# Patient Record
Sex: MALE | Race: WHITE | Employment: OTHER | URBAN - METROPOLITAN AREA
[De-identification: names, ages, dates, MRNs, and addresses within clinical notes are randomized per-mention and may not be internally consistent; named-entity substitution may affect disease eponyms.]

---

## 2017-04-07 ENCOUNTER — CARE COORDINATION (OUTPATIENT)
Dept: CASE MANAGEMENT | Facility: CLINIC | Age: 82
End: 2017-04-07

## 2019-11-18 ENCOUNTER — ANESTHESIA EVENT (OUTPATIENT)
Dept: GASTROENTEROLOGY | Facility: CLINIC | Age: 84
End: 2019-11-18
Payer: COMMERCIAL

## 2019-11-18 ENCOUNTER — ANESTHESIA (OUTPATIENT)
Dept: GASTROENTEROLOGY | Facility: CLINIC | Age: 84
End: 2019-11-18
Payer: COMMERCIAL

## 2019-11-18 ENCOUNTER — HOSPITAL ENCOUNTER (OUTPATIENT)
Facility: CLINIC | Age: 84
Discharge: HOME | End: 2019-11-18
Admitting: PATHOLOGY
Payer: COMMERCIAL

## 2019-11-18 VITALS
HEART RATE: 88 BPM | OXYGEN SATURATION: 98 % | HEIGHT: 71 IN | SYSTOLIC BLOOD PRESSURE: 159 MMHG | BODY MASS INDEX: 19.18 KG/M2 | WEIGHT: 137 LBS | DIASTOLIC BLOOD PRESSURE: 71 MMHG | RESPIRATION RATE: 16 BRPM

## 2019-11-18 DIAGNOSIS — D47.2 MONOCLONAL GAMMOPATHY: Primary | ICD-10-CM

## 2019-11-18 LAB
BASOPHILS # BLD AUTO: 0 10E9/L (ref 0–0.2)
BASOPHILS NFR BLD AUTO: 0.6 %
DIFFERENTIAL METHOD BLD: ABNORMAL
EOSINOPHIL # BLD AUTO: 0.1 10E9/L (ref 0–0.7)
EOSINOPHIL NFR BLD AUTO: 1.7 %
ERYTHROCYTE [DISTWIDTH] IN BLOOD BY AUTOMATED COUNT: 13.3 % (ref 10–15)
HCT VFR BLD AUTO: 36.9 % (ref 40–53)
HGB BLD-MCNC: 12.2 G/DL (ref 13.3–17.7)
IMM GRANULOCYTES # BLD: 0 10E9/L (ref 0–0.4)
IMM GRANULOCYTES NFR BLD: 0.3 %
LYMPHOCYTES # BLD AUTO: 1.1 10E9/L (ref 0.8–5.3)
LYMPHOCYTES NFR BLD AUTO: 16.3 %
MCH RBC QN AUTO: 33.5 PG (ref 26.5–33)
MCHC RBC AUTO-ENTMCNC: 33.1 G/DL (ref 31.5–36.5)
MCV RBC AUTO: 101 FL (ref 78–100)
MONOCYTES # BLD AUTO: 0.7 10E9/L (ref 0–1.3)
MONOCYTES NFR BLD AUTO: 10.3 %
NEUTROPHILS # BLD AUTO: 4.9 10E9/L (ref 1.6–8.3)
NEUTROPHILS NFR BLD AUTO: 70.8 %
NRBC # BLD AUTO: 0 10*3/UL
NRBC BLD AUTO-RTO: 0 /100
PLATELET # BLD AUTO: 239 10E9/L (ref 150–450)
RBC # BLD AUTO: 3.64 10E12/L (ref 4.4–5.9)
RETICS # AUTO: 68.8 10E9/L (ref 25–95)
RETICS/RBC NFR AUTO: 1.9 % (ref 0.5–2)
WBC # BLD AUTO: 6.9 10E9/L (ref 4–11)

## 2019-11-18 PROCEDURE — 85097 BONE MARROW INTERPRETATION: CPT | Performed by: INTERNAL MEDICINE

## 2019-11-18 PROCEDURE — 38221 DX BONE MARROW BIOPSIES: CPT | Performed by: INTERNAL MEDICINE

## 2019-11-18 PROCEDURE — 25000128 H RX IP 250 OP 636: Performed by: NURSE ANESTHETIST, CERTIFIED REGISTERED

## 2019-11-18 PROCEDURE — 88237 TISSUE CULTURE BONE MARROW: CPT | Performed by: INTERNAL MEDICINE

## 2019-11-18 PROCEDURE — 88184 FLOWCYTOMETRY/ TC 1 MARKER: CPT | Performed by: PATHOLOGY

## 2019-11-18 PROCEDURE — 88313 SPECIAL STAINS GROUP 2: CPT | Performed by: INTERNAL MEDICINE

## 2019-11-18 PROCEDURE — 25800030 ZZH RX IP 258 OP 636: Performed by: NURSE ANESTHETIST, CERTIFIED REGISTERED

## 2019-11-18 PROCEDURE — 88342 IMHCHEM/IMCYTCHM 1ST ANTB: CPT | Performed by: INTERNAL MEDICINE

## 2019-11-18 PROCEDURE — 85045 AUTOMATED RETICULOCYTE COUNT: CPT | Performed by: PATHOLOGY

## 2019-11-18 PROCEDURE — 88311 DECALCIFY TISSUE: CPT | Mod: 26 | Performed by: INTERNAL MEDICINE

## 2019-11-18 PROCEDURE — 40000795 ZZHCL STATISTIC DNA PROCESS AND HOLD: Performed by: PATHOLOGY

## 2019-11-18 PROCEDURE — 36415 COLL VENOUS BLD VENIPUNCTURE: CPT | Performed by: PATHOLOGY

## 2019-11-18 PROCEDURE — 88305 TISSUE EXAM BY PATHOLOGIST: CPT | Mod: 26 | Performed by: INTERNAL MEDICINE

## 2019-11-18 PROCEDURE — 00000008 ZZHCL STATISTIC ADDL BM ASP PERF PF 38220: Performed by: INTERNAL MEDICINE

## 2019-11-18 PROCEDURE — 40000010 ZZH STATISTIC ANES STAT CODE-CRNA PER MINUTE: Performed by: PATHOLOGY

## 2019-11-18 PROCEDURE — 85025 COMPLETE CBC W/AUTO DIFF WBC: CPT | Performed by: PATHOLOGY

## 2019-11-18 PROCEDURE — 88305 TISSUE EXAM BY PATHOLOGIST: CPT | Performed by: INTERNAL MEDICINE

## 2019-11-18 PROCEDURE — 88313 SPECIAL STAINS GROUP 2: CPT | Mod: 26 | Performed by: INTERNAL MEDICINE

## 2019-11-18 PROCEDURE — 38221 DX BONE MARROW BIOPSIES: CPT | Performed by: PATHOLOGY

## 2019-11-18 PROCEDURE — 38222 DX BONE MARROW BX & ASPIR: CPT | Performed by: PATHOLOGY

## 2019-11-18 PROCEDURE — 37000008 ZZH ANESTHESIA TECHNICAL FEE, 1ST 30 MIN: Performed by: PATHOLOGY

## 2019-11-18 PROCEDURE — 40001004 ZZHCL STATISTIC FLOW INT 9-15 ABY TC 88188: Performed by: PATHOLOGY

## 2019-11-18 PROCEDURE — 00000159 ZZHCL STATISTIC H-SEND OUTS PREP: Performed by: INTERNAL MEDICINE

## 2019-11-18 PROCEDURE — 88271 CYTOGENETICS DNA PROBE: CPT | Performed by: INTERNAL MEDICINE

## 2019-11-18 PROCEDURE — 88280 CHROMOSOME KARYOTYPE STUDY: CPT | Performed by: INTERNAL MEDICINE

## 2019-11-18 PROCEDURE — 40000424 ZZHCL STATISTIC BONE MARROW CORE PERF TC 38221: Performed by: INTERNAL MEDICINE

## 2019-11-18 PROCEDURE — 00000058 ZZHCL STATISTIC BONE MARROW ASP PERF TC 38220: Performed by: INTERNAL MEDICINE

## 2019-11-18 PROCEDURE — 88185 FLOWCYTOMETRY/TC ADD-ON: CPT | Performed by: PATHOLOGY

## 2019-11-18 PROCEDURE — 85060 BLOOD SMEAR INTERPRETATION: CPT | Performed by: INTERNAL MEDICINE

## 2019-11-18 PROCEDURE — 88342 IMHCHEM/IMCYTCHM 1ST ANTB: CPT | Mod: 26 | Performed by: INTERNAL MEDICINE

## 2019-11-18 PROCEDURE — 40000847 ZZHCL STATISTIC MORPHOLOGY W/INTERP HISTOLOGY TC 85060: Performed by: INTERNAL MEDICINE

## 2019-11-18 PROCEDURE — 38222 DX BONE MARROW BX & ASPIR: CPT | Performed by: INTERNAL MEDICINE

## 2019-11-18 PROCEDURE — 88264 CHROMOSOME ANALYSIS 20-25: CPT | Performed by: INTERNAL MEDICINE

## 2019-11-18 PROCEDURE — 88275 CYTOGENETICS 100-300: CPT | Performed by: INTERNAL MEDICINE

## 2019-11-18 PROCEDURE — 88311 DECALCIFY TISSUE: CPT | Performed by: INTERNAL MEDICINE

## 2019-11-18 PROCEDURE — 40000948 ZZHCL STATISTIC BONE MARROW ASP TC 85097: Performed by: INTERNAL MEDICINE

## 2019-11-18 RX ORDER — LIDOCAINE HYDROCHLORIDE 10 MG/ML
8-10 INJECTION, SOLUTION EPIDURAL; INFILTRATION; INTRACAUDAL; PERINEURAL
Status: DISCONTINUED | OUTPATIENT
Start: 2019-11-18 | End: 2019-11-18 | Stop reason: HOSPADM

## 2019-11-18 RX ORDER — FLUMAZENIL 0.1 MG/ML
0.2 INJECTION, SOLUTION INTRAVENOUS
Status: DISCONTINUED | OUTPATIENT
Start: 2019-11-18 | End: 2019-11-18 | Stop reason: HOSPADM

## 2019-11-18 RX ORDER — HYDROMORPHONE HYDROCHLORIDE 1 MG/ML
.3-.5 INJECTION, SOLUTION INTRAMUSCULAR; INTRAVENOUS; SUBCUTANEOUS EVERY 5 MIN PRN
Status: DISCONTINUED | OUTPATIENT
Start: 2019-11-18 | End: 2019-11-18 | Stop reason: HOSPADM

## 2019-11-18 RX ORDER — SODIUM CHLORIDE, SODIUM LACTATE, POTASSIUM CHLORIDE, CALCIUM CHLORIDE 600; 310; 30; 20 MG/100ML; MG/100ML; MG/100ML; MG/100ML
INJECTION, SOLUTION INTRAVENOUS CONTINUOUS
Status: DISCONTINUED | OUTPATIENT
Start: 2019-11-18 | End: 2019-11-18 | Stop reason: HOSPADM

## 2019-11-18 RX ORDER — UBIDECARENONE 75 MG
100 CAPSULE ORAL DAILY
Status: ON HOLD | COMMUNITY
End: 2021-05-01

## 2019-11-18 RX ORDER — ONDANSETRON 2 MG/ML
4 INJECTION INTRAMUSCULAR; INTRAVENOUS EVERY 30 MIN PRN
Status: DISCONTINUED | OUTPATIENT
Start: 2019-11-18 | End: 2019-11-18 | Stop reason: HOSPADM

## 2019-11-18 RX ORDER — ONDANSETRON 2 MG/ML
INJECTION INTRAMUSCULAR; INTRAVENOUS PRN
Status: DISCONTINUED | OUTPATIENT
Start: 2019-11-18 | End: 2019-11-18

## 2019-11-18 RX ORDER — NALOXONE HYDROCHLORIDE 0.4 MG/ML
.1-.4 INJECTION, SOLUTION INTRAMUSCULAR; INTRAVENOUS; SUBCUTANEOUS
Status: DISCONTINUED | OUTPATIENT
Start: 2019-11-18 | End: 2019-11-18 | Stop reason: HOSPADM

## 2019-11-18 RX ORDER — PROPOFOL 10 MG/ML
INJECTION, EMULSION INTRAVENOUS CONTINUOUS PRN
Status: DISCONTINUED | OUTPATIENT
Start: 2019-11-18 | End: 2019-11-18

## 2019-11-18 RX ORDER — MEPERIDINE HYDROCHLORIDE 25 MG/ML
12.5 INJECTION INTRAMUSCULAR; INTRAVENOUS; SUBCUTANEOUS EVERY 5 MIN PRN
Status: DISCONTINUED | OUTPATIENT
Start: 2019-11-18 | End: 2019-11-18 | Stop reason: HOSPADM

## 2019-11-18 RX ORDER — ALBUTEROL SULFATE 0.83 MG/ML
2.5 SOLUTION RESPIRATORY (INHALATION) EVERY 4 HOURS PRN
Status: DISCONTINUED | OUTPATIENT
Start: 2019-11-18 | End: 2019-11-18 | Stop reason: HOSPADM

## 2019-11-18 RX ORDER — HYDRALAZINE HYDROCHLORIDE 20 MG/ML
2.5-5 INJECTION INTRAMUSCULAR; INTRAVENOUS EVERY 10 MIN PRN
Status: DISCONTINUED | OUTPATIENT
Start: 2019-11-18 | End: 2019-11-18 | Stop reason: HOSPADM

## 2019-11-18 RX ORDER — SODIUM CHLORIDE, SODIUM LACTATE, POTASSIUM CHLORIDE, CALCIUM CHLORIDE 600; 310; 30; 20 MG/100ML; MG/100ML; MG/100ML; MG/100ML
INJECTION, SOLUTION INTRAVENOUS CONTINUOUS PRN
Status: DISCONTINUED | OUTPATIENT
Start: 2019-11-18 | End: 2019-11-18

## 2019-11-18 RX ORDER — FENTANYL CITRATE 50 UG/ML
25-50 INJECTION, SOLUTION INTRAMUSCULAR; INTRAVENOUS
Status: DISCONTINUED | OUTPATIENT
Start: 2019-11-18 | End: 2019-11-18 | Stop reason: HOSPADM

## 2019-11-18 RX ORDER — PROPOFOL 10 MG/ML
INJECTION, EMULSION INTRAVENOUS PRN
Status: DISCONTINUED | OUTPATIENT
Start: 2019-11-18 | End: 2019-11-18

## 2019-11-18 RX ORDER — ONDANSETRON 4 MG/1
4 TABLET, ORALLY DISINTEGRATING ORAL EVERY 30 MIN PRN
Status: DISCONTINUED | OUTPATIENT
Start: 2019-11-18 | End: 2019-11-18 | Stop reason: HOSPADM

## 2019-11-18 RX ORDER — LABETALOL HYDROCHLORIDE 5 MG/ML
10 INJECTION, SOLUTION INTRAVENOUS
Status: DISCONTINUED | OUTPATIENT
Start: 2019-11-18 | End: 2019-11-18 | Stop reason: HOSPADM

## 2019-11-18 RX ADMIN — PROPOFOL 75 MCG/KG/MIN: 10 INJECTION, EMULSION INTRAVENOUS at 12:48

## 2019-11-18 RX ADMIN — SODIUM CHLORIDE, POTASSIUM CHLORIDE, SODIUM LACTATE AND CALCIUM CHLORIDE: 600; 310; 30; 20 INJECTION, SOLUTION INTRAVENOUS at 12:45

## 2019-11-18 RX ADMIN — ONDANSETRON 4 MG: 2 INJECTION INTRAMUSCULAR; INTRAVENOUS at 12:49

## 2019-11-18 RX ADMIN — PROPOFOL 10 MG: 10 INJECTION, EMULSION INTRAVENOUS at 13:00

## 2019-11-18 RX ADMIN — PROPOFOL 30 MG: 10 INJECTION, EMULSION INTRAVENOUS at 12:48

## 2019-11-18 ASSESSMENT — MIFFLIN-ST. JEOR: SCORE: 1313.56

## 2019-11-18 NOTE — ANESTHESIA PREPROCEDURE EVALUATION
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]    Procedure(s):  BIOPSY, BONE MARROW    Past Medical History:   Diagnosis Date     Hypertension      Past Surgical History:   Procedure Laterality Date     APPENDECTOMY       CHOLECYSTECTOMY       HERNIA REPAIR       ORTHOPEDIC SURGERY      L ankle fx     PHACOEMULSIFICATION CLEAR CORNEA WITH STANDARD INTRAOCULAR LENS IMPLANT Right 2016    Procedure: PHACOEMULSIFICATION CLEAR CORNEA WITH STANDARD INTRAOCULAR LENS IMPLANT;  Surgeon: Frandy Weber MD;  Location: [facility]     THORACIC SURGERY      r lung lobectomy     VITRECTOMY PARSPLANA WITH 23 GAUGE SYSTEM Right 2016    Procedure: VITRECTOMY PARSPLANA WITH 23 GAUGE SYSTEM;  Surgeon: Deniz Ross MD;  Location: [facility]       Anesthesia Evaluation     . Pt has had prior anesthetic.            ROS/MED HX    ENT/Pulmonary:      (-) sleep apnea   Neurologic:       Cardiovascular:     (+) hypertension----. : . . . :. .       METS/Exercise Tolerance:     Hematologic:         Musculoskeletal:         GI/Hepatic:        (-) GERD   Renal/Genitourinary:         Endo:         Psychiatric:         Infectious Disease:         Malignancy:   (+)   Suspicion for multiple myeloma        Other:                          Physical Exam  Normal systems: cardiovascular, pulmonary and dental    Airway   Mallampati: II  TM distance: >3 FB  Neck ROM: full    Dental     Cardiovascular   Rhythm and rate: regular and normal      Pulmonary             Lab Results   Component Value Date    WBC 6.9 2019    HGB 12.2 (L) 2019    HCT 36.9 (L) 2019     2019     (L) 2005    POTASSIUM 4.5 2005    CHLORIDE 102 2005    CO2 27 2005    BUN 15 2005    CR 1.00 2005     (H) 2005    SUDHEER 7.5 (L) 2005    ALBUMIN 3.1 (L) 2005    PROTTOTAL  "6.7 01/26/2005     (H) 01/26/2005     (H) 01/26/2005    ALKPHOS 343 (H) 01/26/2005    BILITOTAL 1.3 01/26/2005    LIPASE 62 01/20/2005    AMYLASE 571 (H) 01/22/2005    PTT 27 01/20/2005    INR 1.07 01/21/2005       Preop Vitals  BP Readings from Last 3 Encounters:   11/18/19 (!) 185/93   02/17/16 180/90    Pulse Readings from Last 3 Encounters:   No data found for Pulse      Resp Readings from Last 3 Encounters:   11/18/19 16   02/17/16 16    SpO2 Readings from Last 3 Encounters:   11/18/19 99%   02/17/16 94%      Temp Readings from Last 1 Encounters:   02/17/16 35.9  C (96.7  F) (Temporal)    Ht Readings from Last 1 Encounters:   11/18/19 1.803 m (5' 11\")      Wt Readings from Last 1 Encounters:   11/18/19 62.1 kg (137 lb)    Estimated body mass index is 19.11 kg/m  as calculated from the following:    Height as of this encounter: 1.803 m (5' 11\").    Weight as of this encounter: 62.1 kg (137 lb).       Anesthesia Plan      History & Physical Review  History and physical reviewed and following examination; no interval change.    ASA Status:  3 .    NPO Status:  > 8 hours    Plan for MAC Reason for MAC:  Deep or markedly invasive procedure (G8)  PONV prophylaxis:  Ondansetron (or other 5HT-3)       Postoperative Care  Postoperative pain management:  IV analgesics and Oral pain medications.      Consents  Anesthetic plan, risks, benefits and alternatives discussed with:  Patient..                 Iliana Hamilton MD, MD  "

## 2019-11-18 NOTE — ANESTHESIA CARE TRANSFER NOTE
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    Anesthesia Type:   MAC     Note:  Airway :Face Mask  Patient transferred to:PACU  Comments: After procedure in [facility] [place] under monitored anesthesia care (MAC), patient exhibited spontaneous respirations, oxygen via facemask with oxygen saturation maintained greater than 98%, patient brought to [facility] recovery bay for postprocedure recovery, SpO2, NiBP, and EKG monitors and alarms on and functioning, report on patient's clinical status given to PACU RN, RN questions answered, oxygen tubing connected to wall O2 in recovery room.Handoff Report: Identifed the Patient, Identified the Reponsible Provider, Reviewed the pertinent medical history, Discussed the surgical course, Reviewed Intra-OP anesthesia mangement and issues during anesthesia, Set expectations for post-procedure period and Allowed opportunity for questions and acknowledgement of understanding      Vitals: (Last set prior to Anesthesia Care Transfer)    CRNA VITALS  11/18/2019 1237 - 11/18/2019 1314      11/18/2019             Ht Rate:  88    Resp Rate (set):  10                Electronically Signed By: SANAZ Mascorro CRNA  November 18, 2019  1:14 PM

## 2019-11-18 NOTE — ANESTHESIA POSTPROCEDURE EVALUATION
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    Anesthesia Type:  MAC    Note:  Anesthesia Post Evaluation    Patient location during evaluation: PACU  Patient participation: Able to fully participate in evaluation  Level of consciousness: awake and alert  Pain management: adequate  Airway patency: patent  Cardiovascular status: acceptable  Respiratory status: acceptable  Hydration status: acceptable  PONV: none     Anesthetic complications: None          Last vitals:  Vitals:    11/18/19 1330 11/18/19 1340 11/18/19 1341   BP: (!) 169/76 (!) 159/71    Pulse: 86 88    Resp: 15 12 16   SpO2: 96% 99% 98%         Electronically Signed By: Iliana Hamilton MD, MD  November 18, 2019  4:29 PM

## 2019-11-18 NOTE — OR NURSING
Bone marrow dressing clean, dry and intact at discharge.  Patient understands discharge instructions.

## 2019-11-19 LAB — COPATH REPORT: NORMAL

## 2019-11-20 LAB
COPATH REPORT: NORMAL
COPATH REPORT: NORMAL

## 2019-11-26 LAB
COPATH REPORT: NORMAL
COPATH REPORT: NORMAL

## 2019-12-02 LAB — COPATH REPORT: NORMAL

## 2020-11-30 ENCOUNTER — HOSPITAL ENCOUNTER (EMERGENCY)
Facility: CLINIC | Age: 85
Discharge: HOME | End: 2020-11-30
Attending: EMERGENCY MEDICINE | Admitting: EMERGENCY MEDICINE
Payer: COMMERCIAL

## 2020-11-30 ENCOUNTER — APPOINTMENT (OUTPATIENT)
Dept: GENERAL RADIOLOGY | Facility: CLINIC | Age: 85
End: 2020-11-30
Attending: EMERGENCY MEDICINE
Payer: COMMERCIAL

## 2020-11-30 VITALS
OXYGEN SATURATION: 98 % | DIASTOLIC BLOOD PRESSURE: 92 MMHG | HEART RATE: 84 BPM | RESPIRATION RATE: 16 BRPM | SYSTOLIC BLOOD PRESSURE: 175 MMHG | TEMPERATURE: 98.1 F

## 2020-11-30 DIAGNOSIS — S32.010A COMPRESSION FRACTURE OF L1 VERTEBRA, INITIAL ENCOUNTER (H): ICD-10-CM

## 2020-11-30 DIAGNOSIS — S30.0XXA CONTUSION OF LOWER BACK, INITIAL ENCOUNTER: ICD-10-CM

## 2020-11-30 PROCEDURE — 72100 X-RAY EXAM L-S SPINE 2/3 VWS: CPT

## 2020-11-30 PROCEDURE — 99283 EMERGENCY DEPT VISIT LOW MDM: CPT

## 2020-11-30 ASSESSMENT — ENCOUNTER SYMPTOMS
BACK PAIN: 1
NUMBNESS: 1
FEVER: 0
HEMATURIA: 0

## 2020-11-30 NOTE — ED NOTES
Bed: ED19  Expected date: 11/30/20  Expected time: 12:51 PM  Means of arrival:   Comments:  triage

## 2020-11-30 NOTE — ED PROVIDER NOTES
"   History   Chief Complaint  Back Pain    The history is provided by the patient.      Praveen Velasquez is an 89 year old male with a history of hypertension and monoclonal gammopathy who presents for evaluation of lower back pain after falling off a step and landing on his back about one week ago. The pain has not worsened or improved since the onset. The patient is exacerbated by movement and improved by lying down. He does have pain in his left leg and tingling in the associated foot due to an old injury. He also has urinary incontinence, but this started prior to his back pain. When asked what he takes for the pain, he states \"anything he can find.\" He denies fevers and hematuria. He is not on blood thinners. He lives alone, but his brother comes to help care for him.     Allergies  No Known Drug Allergies    Medications  Lisinopril     Past Medical History  Hypertension  Monoclonal gammopathy     Past Surgical History  Appendectomy  Bone marrow biopsy  Cholecystectomy  Hernia repair  Left ankle fracture repair  Phacoemulsification clear cornea   Right lung lobectomy  Vitrectomy parsplana     Family History  History reviewed. No pertinent family history.    Social History  The patient was unaccompanied to the ED.  Smoking Status: current some day smoker  Smokeless Tobacco: never  Alcohol Use: yes  Drug Use: no    Review of Systems   Constitutional: Negative for fever.   Genitourinary: Negative for hematuria.   Musculoskeletal: Positive for back pain.   Neurological: Positive for numbness (chronic).        Chronic urinary incontinence   All other systems reviewed and are negative.      Physical Exam     Patient Vitals for the past 24 hrs:   BP Temp Temp src Pulse Resp SpO2   11/30/20 1445 (!) 175/92 98.1  F (36.7  C) Oral 84 -- 98 %   11/30/20 1312 -- -- -- -- -- 98 %   11/30/20 1310 125/72 -- -- 82 16 --       Physical Exam  Nursing note and vitals reviewed.  Constitutional:  Oriented to person, place, and time. " Cooperative.   HENT:   Nose:    Nose normal.   Mouth/Throat:   Mucous membranes are normal.   Eyes:    Conjunctivae normal and EOM are normal.      Pupils are equal, round, and reactive to light.   Neck:    Trachea normal.   Cardiovascular:  Normal rate, regular rhythm, normal heart sounds and normal pulses. No murmur heard.  Pulmonary/Chest:  Effort normal and breath sounds normal.   Abdominal:   Soft. Normal appearance and bowel sounds are normal.      There is no tenderness.      There is no rebound and no CVA tenderness.   Musculoskeletal:  Back is atraumatic. No reproducible tenderness to palpation at this time. Extremities atraumatic x 4.   Lymphadenopathy:  No cervical adenopathy.   Neurological:   Alert and oriented to person, place, and time. Normal strength.      No cranial nerve deficit or sensory deficit. GCS eye subscore is 4. GCS verbal subscore is 5. GCS motor subscore is 6. 5/5 strength in all muscle groups of the lower extremities.  Sensation intact to light touch distally. DTRs equal and symmetric in the lower extremities. Straight leg raises negative bilaterally.  Skin:    Skin is intact. No rash noted.   Psychiatric:   Normal mood and affect.      Emergency Department Course   Imaging:  Radiology findings were communicated with the patient who voiced understanding of the findings.    XR lumbar spine 2/3 views:  IMPRESSION: Five lumbar type vertebrae. Chronic-appearing moderate  anterior wedge compression deformity of T12. Age indeterminate mild  compression deformity of the L1 vertebral body. Vertebral body heights  and contours of the lumbar spine are otherwise within normal limits.  No definite recent fracture is noted. Alignment of the lumbar  vertebrae is normal.  Readings per Radiology    Emergency Department Course:  Past medical records, nursing notes, and vitals reviewed.    1305 I physically examined the patient as documented above.    The patient was sent for radiographs while in the  emergency department, results above.     1425 I consulted with Dr. Frandy Vail of radiology.     1430 I rechecked the patient and discussed the findings of their workup thus far.     Findings and plan explained to the Patient. Patient discharged home with instructions regarding supportive care, medications, and reasons to return. The importance of close follow-up was reviewed.     I personally reviewed the imaging results with the Patient and answered all related questions prior to discharge.     Impression & Plan   Medical Decision Making:  This is an 89-year-old male came in for further evaluation of low back pain after falling about a week ago.  He had x-rays obtained, showing the above findings, however it is unclear if any of these are actually new findings.  It is reassuring that he does not really have much pain when he is lying down, making it more likely that his pain is secondary to a contusion or a muscle strain.  He does not have any worrisome symptoms or exam findings for cauda equina syndrome, and therefore I do not feel that he warrants advanced imaging with CT or MRI.  I indicated that he should use ice or heat as well as Tylenol and ibuprofen.  He should follow-up with his physician as needed and return with any concerns or worsening symptoms.    Diagnosis:    ICD-10-CM    1. Contusion of lower back, initial encounter  S30.0XXA    2. Compression fracture of L1 vertebra, initial encounter (H)  S32.010A      Disposition:  Discharged to home.    Discharge Medications:  New Prescriptions    No medications on file     Scribe Disclosure:  I, Darline Toledo, am serving as a scribe at 12:58 PM on 11/30/2020 to document services personally performed by Chan Buck MD based on my observations and the provider's statements to me.      Chan Buck MD  11/30/20 1517       Chan Buck MD  11/30/20 8058

## 2020-11-30 NOTE — ED AVS SNAPSHOT
[facility] Emergency Dept  [address] [address]  Phone: [phone]  Fax: [phone]                                    Praveen Velasquez   MRN: [id number]    Department: [facility] Emergency Dept   Date of Visit: 11/30/2020           After Visit Summary Signature Page    I have received my discharge instructions, and my questions have been answered. I have discussed any challenges I see with this plan with the nurse or doctor.    ..........................................................................................................................................  Patient/Patient Representative Signature      ..........................................................................................................................................  Patient Representative Print Name and Relationship to Patient    ..................................................               ................................................  Date                                   Time    ..........................................................................................................................................  Reviewed by Signature/Title    ...................................................              ..............................................  Date                                               Time          22EPIC Rev 08/18

## 2021-01-01 ENCOUNTER — PATIENT OUTREACH (OUTPATIENT)
Dept: CARE COORDINATION | Facility: CLINIC | Age: 86
End: 2021-01-01
Payer: COMMERCIAL

## 2021-01-01 DIAGNOSIS — Z65.9 PSYCHOSOCIAL PROBLEM: Primary | ICD-10-CM

## 2021-01-01 ASSESSMENT — ACTIVITIES OF DAILY LIVING (ADL)
DEPENDENT_IADLS:: CLEANING;COOKING;LAUNDRY;SHOPPING;MEAL PREPARATION;MEDICATION MANAGEMENT;MONEY MANAGEMENT;TRANSPORTATION;INCONTINENCE

## 2021-01-11 ENCOUNTER — PATIENT OUTREACH (OUTPATIENT)
Dept: CARE COORDINATION | Facility: CLINIC | Age: 86
End: 2021-01-11

## 2021-01-11 DIAGNOSIS — F10.20 ALCOHOLISM (H): Primary | ICD-10-CM

## 2021-01-11 NOTE — PROGRESS NOTES
Clinic Care Coordination Contact  [facility]/Voicemail       Clinical Data: Care Coordinator Outreach  Outreach attempted x 1.  Left message on patient's voicemail with call back information and requested return call.  . Care Coordinator will try to reach patient again in 1-2 business days.

## 2021-01-12 ENCOUNTER — PATIENT OUTREACH (OUTPATIENT)
Dept: CARE COORDINATION | Facility: CLINIC | Age: 86
End: 2021-01-12

## 2021-01-12 ASSESSMENT — ACTIVITIES OF DAILY LIVING (ADL): DEPENDENT_IADLS:: CLEANING;LAUNDRY;SHOPPING;MEDICATION MANAGEMENT;MONEY MANAGEMENT;TRANSPORTATION

## 2021-01-12 NOTE — PROGRESS NOTES
Clinic Care Coordination Contact    Clinic Care Coordination Contact  OUTREACH    Referral Information:  Referral Source: PCP         Chief Complaint   Patient presents with     Clinic Care Coordination - Initial        Universal Utilization: na  Clinic Utilization  Difficulty keeping appointments:: No  Compliance Concerns: No  No-Show Concerns: No  Utilization    Last refreshed: 1/11/2021  9:52 AM: Hospital Admissions 0           Last refreshed: 1/11/2021  9:52 AM: ED Visits 1           Last refreshed: 1/11/2021  9:52 AM: No Show Count (past year) 0              Current as of: 1/11/2021  9:52 AM              Clinical Concerns:  Current Medical Concerns:   Stage three kidney disease, alcoholism  Current Behavioral Concerns: depression  Education Provided to patient: NA     Health Maintenance Reviewed:    Clinical Pathway: None    Medication Management:  Ipatropium Bromide, Lisinopril 10mg      Functional Status:  Dependent ADLs:: Ambulation-cane  Dependent IADLs:: Cleaning, Laundry, Shopping, Medication Management, Money Management, Transportation  Bed or wheelchair confined:: No  Mobility Status: Independent  Fallen 2 or more times in the past year?: Yes  Any fall with injury in the past year?: Yes    Living Situation:  Current living arrangement:: I live in a private home with family  Type of residence:: Private home - [facility]    Lifestyle & Psychosocial Needs:        Diet:: Regular  Inadequate nutrition (GOAL):: No  Tube Feeding: No  Transportation means:: Family     Restorationist or spiritual beliefs that impact treatment:: No  Mental health DX:: No  Mental health management concern (GOAL):: No  Chemical Dependency Status: Current Concern  Informal Support system:: Family   Socioeconomic History     Marital status:      Spouse name: Not on file     Number of children: Not on file     Years of education: Not on file     Highest education level: Not on file     Tobacco Use     Smoking status: Current Some Day  Smoker     Types: Cigarettes     Smokeless tobacco: Never Used   Substance and Sexual Activity     Alcohol use: Yes     Comment: 1-2 per day     Drug use: No     PC to Natan's daugther, Deann and left a vm. [facility] also phoned Praveen Huerta and spoke with his brother Mark. Mark stated that he and Praveen daughter Deann and son, Robbie (also his POA) are concerned for Praveen's safety as his living arrangements are conducive to falling. He lives in a mid [place] [facility] level home and has to go uip and down stairs to get around. Mark, his brother has spent the last 3 years staying with him for 6-9 months at a time although he has a family and life in [place] and would like to get back there. Son and daughter come in from time to time to check on him and keep him company. Karthik reportedly has no friends or social connections.  His 2nd wife passed away two years ago.    Per Mark, Karthik has been an alcoholic for over 50 years. The family outfit melia with alcohol on a weekly basis  because they are fearful of him going into withdrawls if they keep it from him. Karthik has had his drving privlieges revoked so he can no longer drive.     Karthik is not interested in moving out of his home at this time. Per his brother Mark,family would like to get in home supports in place such as: skilled nursing to help with medication manageement and checking of vitals, possbile personal care assistant to help with householed chores and / or cooking/ and or bathing if needed. Per Mark, Karthik has the capacity to private pay for services if need be.       [facility] phoned [facility] @[phone] and they would be able to do a home care evaluation and begin to implement services. After Medicare payments stop, they can help the family connect to private pay supports as well. [facility] will request order to be faxed t : [phone].     [facility] also emailed Mark a list of home care supports to review with family and Karthik to determine what  sort of other supports he'd be willing to accept.    Plan: [facility] to request order from PCP for home care evaluation, [facility] will call  Mark to inform of status. Mark's cell number is; [phone]          Resources and Interventions:  Current Resources:      Community Resources: None  Supplies used at home:: None     Employment Status: retired)   )    Advance Care Plan/Directive  Advanced Care Plans/Directives on file:: No  Advanced Care Plan/Directive Status: Declined Further Information    Referrals Placed: Home Care     Goals: Receive home care evaluation      Patient/Caregiver understanding: yes           Plan: [facility] to request referral for Home care evaluation at [facility], fax order and cc will follow up with pt. And family.

## 2021-01-14 ENCOUNTER — PATIENT OUTREACH (OUTPATIENT)
Dept: CARE COORDINATION | Facility: CLINIC | Age: 86
End: 2021-01-14

## 2021-01-19 ENCOUNTER — PATIENT OUTREACH (OUTPATIENT)
Dept: CARE COORDINATION | Facility: CLINIC | Age: 86
End: 2021-01-19

## 2021-01-19 NOTE — PROGRESS NOTES
Clinic Care Coordination Contact    Follow Up Progress Note      Assessment: PC to Natan's brother, Mark. Per Mark, Praveen is not wanting to have anyone come to the home to do physical therapy with him. Mark stated that Praveen plans to call today to inform them he does not want their services. Mark did say that Praveen is open to a home care evaluation. [facility] encouraged Mark to talk with Praveen about the value of having a home care evaluation to help support Karthik to stay in his how.   Mark stated that Natan's son has POA but there is currently no health care directives. Mark stated that they are working on that with Praveen.  Mark informed this writer that Praveen is out of his Lisinopril and that he had called the clinic to request a refill however has not received a call back. [facility] phoned [facility] and requested clinic call Praveen. They stated the script was filled via [facility] on 1/15 however due to the holiday, it may be getting to him late. They will call to see if he'd like it sent to a local pharmacy.    Goals addressed this encounter:   Goals Addressed    None          Intervention/Education provided during outreach: non          Plan: Praveen to connect with home care staff and schedule an evaluation, Refill Lisinopril  Care Coordinator will follow up in 1 week.

## 2021-01-25 ENCOUNTER — PATIENT OUTREACH (OUTPATIENT)
Dept: CARE COORDINATION | Facility: CLINIC | Age: 86
End: 2021-01-25

## 2021-01-25 ENCOUNTER — APPOINTMENT (OUTPATIENT)
Dept: CARE COORDINATION | Facility: CLINIC | Age: 86
End: 2021-01-25
Payer: COMMERCIAL

## 2021-01-25 NOTE — PROGRESS NOTES
Clinic Care Coordination Contact    Follow Up Progress Note      Assessment: PC to Jennifer at [facility] who stated that Praveen had refused home care support. This writer had received a vm from pt's brother last week stating that home care had not contacted them but it appears they did and spoke directly with Karthik.   [facility] phoned Praveen brother and stated that Karthik can choose what supports he wants and doesn't want. [facility] shared resource for Adult protection if family feels that Karthik is a danger to himself or others as well as [facility] Criisis line. CC encouraged pt's brother to utilize  [facility] if Karthik becomes increasingly depressed, beligerent or dangerous to self or other. [facility] informed them that [facility] has a psych unit as well as a Geriatric pscyh available if necessary  as well as CD programming or detox if need be. [facility] also shared information for Senior LInkage line in the event they should need addition resources for Karthik moving forward.    Steffen did mention that Karthik has been drinkng more latley and that because of that, his depression is worsening as well as his physical health.    AT this time, [facility] will not outreach again to Karthik/ Family as he is out of scope but encouraged family to connec with resources above or this writer with a questions.       Goals addressed this encounter:   Goals Addressed    None          Intervention/Education provided during outreach: Senior linkage, [facility],           Plan:   [facility] to close out CC  Care Coordinator will not do any more outreaches.

## 2021-01-29 ENCOUNTER — PATIENT OUTREACH (OUTPATIENT)
Dept: CARE COORDINATION | Facility: CLINIC | Age: 86
End: 2021-01-29

## 2021-01-29 NOTE — PROGRESS NOTES
PC from Praveen son, Robbie. He informed me that Praveen had refused all home care services but wanted to know what he could legally do to get some help for his dad.I mentioned that if they are very concerned for his safety that they could connect with adult protection. He stated that they are not quite there yet. [facility] discussed adding a once a week  to help with dinner/dishes and cleaning up a support for Duncan and then after that reintroduce hte idea of OT and a home evaluation to make sure it's safe. Robbie felt that was a good approach.   [facility] will close out CC as pt. Is out of scope.

## 2021-04-28 ENCOUNTER — PATIENT OUTREACH (OUTPATIENT)
Dept: CARE COORDINATION | Facility: CLINIC | Age: 86
End: 2021-04-28

## 2021-04-28 NOTE — PROGRESS NOTES
Clinic Care Coordination Contact    Follow Up Progress Note      Assessment: I spoke with Deann Karthik's daugther. She has an apartment at [facility] living ready for him but he is refusing to go. They are also looking at in home pca, which is also quite costly. In addition to this, he could stay in an apartment above his sons pilar. Per Deann, he refuses to do anything. Deann and her brothe rhave durable power of  and will be checking with an  to see if they need to deem him incompetent to care for himself in order to place him in a nursing facility etc. Deann has called [facility] adult protection several times out of concern for him. [facility] suggest she call Senior linkage line for additional housing/ legal support.s     Goals addressed this encounter:   Goals Addressed    None          Intervention/Education provided during outreach: Senior linkage line, A place for Mom          Plan: NA  Care Coordinator will  Not follow up as pt. Is out of scope

## 2021-04-30 ENCOUNTER — HOSPITAL ENCOUNTER (OUTPATIENT)
Facility: CLINIC | Age: 86
Setting detail: OBSERVATION
Discharge: HOME | End: 2021-05-04
Attending: PHYSICIAN ASSISTANT | Admitting: HOSPITALIST
Payer: COMMERCIAL
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
## 2021-04-30 DIAGNOSIS — N18.9 CHRONIC KIDNEY DISEASE: ICD-10-CM

## 2021-04-30 DIAGNOSIS — R29.6 FALLS FREQUENTLY: ICD-10-CM

## 2021-04-30 LAB
ALBUMIN UR-MCNC: 20 MG/DL
ANION GAP SERPL CALCULATED.3IONS-SCNC: 11 MMOL/L (ref 3–14)
APPEARANCE UR: ABNORMAL
BASOPHILS # BLD AUTO: 0 10E9/L (ref 0–0.2)
BASOPHILS NFR BLD AUTO: 0.4 %
BILIRUB UR QL STRIP: NEGATIVE
BUN SERPL-MCNC: 30 MG/DL (ref 7–30)
CALCIUM SERPL-MCNC: 9 MG/DL (ref 8.5–10.1)
CHLORIDE SERPL-SCNC: 104 MMOL/L (ref 94–109)
CO2 SERPL-SCNC: 20 MMOL/L (ref 20–32)
COLOR UR AUTO: YELLOW
CREAT SERPL-MCNC: 1.68 MG/DL (ref 0.66–1.25)
DIFFERENTIAL METHOD BLD: ABNORMAL
EOSINOPHIL # BLD AUTO: 0 10E9/L (ref 0–0.7)
EOSINOPHIL NFR BLD AUTO: 0 %
ERYTHROCYTE [DISTWIDTH] IN BLOOD BY AUTOMATED COUNT: 13.2 % (ref 10–15)
ETHANOL SERPL-MCNC: 0.02 G/DL
GFR SERPL CREATININE-BSD FRML MDRD: 35 ML/MIN/{1.73_M2}
GLUCOSE SERPL-MCNC: 122 MG/DL (ref 70–99)
GLUCOSE UR STRIP-MCNC: 50 MG/DL
HCT VFR BLD AUTO: 36.2 % (ref 40–53)
HGB BLD-MCNC: 12.1 G/DL (ref 13.3–17.7)
HGB UR QL STRIP: NEGATIVE
HYALINE CASTS #/AREA URNS LPF: 1 /LPF (ref 0–2)
IMM GRANULOCYTES # BLD: 0.1 10E9/L (ref 0–0.4)
IMM GRANULOCYTES NFR BLD: 0.5 %
INTERPRETATION ECG - MUSE: NORMAL
INTERPRETATION ECG - MUSE: NORMAL
KETONES UR STRIP-MCNC: 20 MG/DL
LABORATORY COMMENT REPORT: NORMAL
LEUKOCYTE ESTERASE UR QL STRIP: NEGATIVE
LYMPHOCYTES # BLD AUTO: 0.3 10E9/L (ref 0.8–5.3)
LYMPHOCYTES NFR BLD AUTO: 3.1 %
MCH RBC QN AUTO: 33.2 PG (ref 26.5–33)
MCHC RBC AUTO-ENTMCNC: 33.4 G/DL (ref 31.5–36.5)
MCV RBC AUTO: 99 FL (ref 78–100)
MONOCYTES # BLD AUTO: 0.8 10E9/L (ref 0–1.3)
MONOCYTES NFR BLD AUTO: 7.8 %
MUCOUS THREADS #/AREA URNS LPF: PRESENT /LPF
NEUTROPHILS # BLD AUTO: 8.8 10E9/L (ref 1.6–8.3)
NEUTROPHILS NFR BLD AUTO: 88.2 %
NITRATE UR QL: NEGATIVE
NRBC # BLD AUTO: 0 10*3/UL
NRBC BLD AUTO-RTO: 0 /100
PH UR STRIP: 5.5 PH (ref 5–7)
PLATELET # BLD AUTO: 219 10E9/L (ref 150–450)
POTASSIUM SERPL-SCNC: 4.5 MMOL/L (ref 3.4–5.3)
RBC # BLD AUTO: 3.65 10E12/L (ref 4.4–5.9)
RBC #/AREA URNS AUTO: <1 /HPF (ref 0–2)
SARS-COV-2 RNA RESP QL NAA+PROBE: NEGATIVE
SODIUM SERPL-SCNC: 135 MMOL/L (ref 133–144)
SOURCE: ABNORMAL
SP GR UR STRIP: 1.02 (ref 1–1.03)
SPECIMEN SOURCE: NORMAL
SQUAMOUS #/AREA URNS AUTO: 0 /HPF (ref 0–1)
TROPONIN I SERPL-MCNC: 0.03 UG/L (ref 0–0.04)
TSH SERPL DL<=0.005 MIU/L-ACNC: 0.94 MU/L (ref 0.4–4)
URATE CRY #/AREA URNS HPF: ABNORMAL /HPF
UROBILINOGEN UR STRIP-MCNC: 2 MG/DL (ref 0–2)
WBC # BLD AUTO: 10 10E9/L (ref 4–11)
WBC #/AREA URNS AUTO: 4 /HPF (ref 0–5)

## 2021-04-30 PROCEDURE — 84443 ASSAY THYROID STIM HORMONE: CPT | Performed by: PHYSICIAN ASSISTANT

## 2021-04-30 PROCEDURE — 84484 ASSAY OF TROPONIN QUANT: CPT | Performed by: HOSPITALIST

## 2021-04-30 PROCEDURE — 258N000003 HC RX IP 258 OP 636: Performed by: PHYSICIAN ASSISTANT

## 2021-04-30 PROCEDURE — 81001 URINALYSIS AUTO W/SCOPE: CPT | Performed by: PHYSICIAN ASSISTANT

## 2021-04-30 PROCEDURE — 99220 PR INITIAL OBSERVATION CARE,LEVEL III: CPT | Performed by: HOSPITALIST

## 2021-04-30 PROCEDURE — 82746 ASSAY OF FOLIC ACID SERUM: CPT | Performed by: HOSPITALIST

## 2021-04-30 PROCEDURE — 87635 SARS-COV-2 COVID-19 AMP PRB: CPT | Performed by: PHYSICIAN ASSISTANT

## 2021-04-30 PROCEDURE — 85025 COMPLETE CBC W/AUTO DIFF WBC: CPT | Performed by: PHYSICIAN ASSISTANT

## 2021-04-30 PROCEDURE — 93005 ELECTROCARDIOGRAM TRACING: CPT

## 2021-04-30 PROCEDURE — 36415 COLL VENOUS BLD VENIPUNCTURE: CPT | Performed by: HOSPITALIST

## 2021-04-30 PROCEDURE — 80048 BASIC METABOLIC PNL TOTAL CA: CPT | Performed by: PHYSICIAN ASSISTANT

## 2021-04-30 PROCEDURE — G0378 HOSPITAL OBSERVATION PER HR: HCPCS

## 2021-04-30 PROCEDURE — C9803 HOPD COVID-19 SPEC COLLECT: HCPCS

## 2021-04-30 PROCEDURE — 96360 HYDRATION IV INFUSION INIT: CPT

## 2021-04-30 PROCEDURE — 258N000003 HC RX IP 258 OP 636: Performed by: HOSPITALIST

## 2021-04-30 PROCEDURE — 82607 VITAMIN B-12: CPT | Performed by: HOSPITALIST

## 2021-04-30 PROCEDURE — 90791 PSYCH DIAGNOSTIC EVALUATION: CPT

## 2021-04-30 PROCEDURE — 82077 ASSAY SPEC XCP UR&BREATH IA: CPT | Performed by: PHYSICIAN ASSISTANT

## 2021-04-30 PROCEDURE — 93005 ELECTROCARDIOGRAM TRACING: CPT | Mod: 76

## 2021-04-30 PROCEDURE — 250N000013 HC RX MED GY IP 250 OP 250 PS 637: Performed by: HOSPITALIST

## 2021-04-30 PROCEDURE — 99285 EMERGENCY DEPT VISIT HI MDM: CPT | Mod: 25

## 2021-04-30 PROCEDURE — 96361 HYDRATE IV INFUSION ADD-ON: CPT

## 2021-04-30 RX ORDER — ACETAMINOPHEN 325 MG/1
650 TABLET ORAL EVERY 4 HOURS PRN
Status: DISCONTINUED | OUTPATIENT
Start: 2021-04-30 | End: 2021-05-04 | Stop reason: HOSPADM

## 2021-04-30 RX ORDER — ONDANSETRON 2 MG/ML
4 INJECTION INTRAMUSCULAR; INTRAVENOUS EVERY 6 HOURS PRN
Status: DISCONTINUED | OUTPATIENT
Start: 2021-04-30 | End: 2021-05-04 | Stop reason: HOSPADM

## 2021-04-30 RX ORDER — ONDANSETRON 4 MG/1
4 TABLET, ORALLY DISINTEGRATING ORAL EVERY 6 HOURS PRN
Status: DISCONTINUED | OUTPATIENT
Start: 2021-04-30 | End: 2021-05-04 | Stop reason: HOSPADM

## 2021-04-30 RX ORDER — METOPROLOL TARTRATE 1 MG/ML
2.5 INJECTION, SOLUTION INTRAVENOUS EVERY 4 HOURS PRN
Status: DISCONTINUED | OUTPATIENT
Start: 2021-04-30 | End: 2021-05-04 | Stop reason: HOSPADM

## 2021-04-30 RX ORDER — SODIUM CHLORIDE 9 MG/ML
INJECTION, SOLUTION INTRAVENOUS CONTINUOUS
Status: ACTIVE | OUTPATIENT
Start: 2021-04-30 | End: 2021-05-01

## 2021-04-30 RX ORDER — BISACODYL 10 MG
10 SUPPOSITORY, RECTAL RECTAL DAILY PRN
Status: DISCONTINUED | OUTPATIENT
Start: 2021-04-30 | End: 2021-05-04 | Stop reason: HOSPADM

## 2021-04-30 RX ORDER — ACETAMINOPHEN 650 MG/1
650 SUPPOSITORY RECTAL EVERY 4 HOURS PRN
Status: DISCONTINUED | OUTPATIENT
Start: 2021-04-30 | End: 2021-05-04 | Stop reason: HOSPADM

## 2021-04-30 RX ORDER — SULFAMETHOXAZOLE/TRIMETHOPRIM 800-160 MG
1 TABLET ORAL 2 TIMES DAILY
Status: ON HOLD | COMMUNITY
Start: 2021-04-26 | End: 2021-05-04

## 2021-04-30 RX ADMIN — ACETAMINOPHEN 650 MG: 325 TABLET, FILM COATED ORAL at 23:34

## 2021-04-30 RX ADMIN — SODIUM CHLORIDE: 9 INJECTION, SOLUTION INTRAVENOUS at 23:56

## 2021-04-30 RX ADMIN — Medication 1 MG: at 23:34

## 2021-04-30 RX ADMIN — SODIUM CHLORIDE 1000 ML: 9 INJECTION, SOLUTION INTRAVENOUS at 14:17

## 2021-04-30 ASSESSMENT — MIFFLIN-ST. JEOR: SCORE: 1335.31

## 2021-04-30 ASSESSMENT — ENCOUNTER SYMPTOMS: WEAKNESS: 1

## 2021-04-30 NOTE — DISCHARGE INSTRUCTIONS
Follow-up closely with your primary care provider.    Use your walker to decrease fall risk.   Your creatinine is elevated today.  I want you to follow-up with your primary to recheck your creatinine.  Return to the emergency department for any new or concerning symptoms.    Lt forearm skin tear: change daily until healed then leave open to air   1. Clean gently with soap and water. May shower  2. Apply an over the counter antibiotic ointment to bandaid and cover the skin tear.

## 2021-04-30 NOTE — ED PROVIDER NOTES
"  History   Chief Complaint  Fall    HPI  Praveen Velasquez is a 89 year old male with a history of hypertension who presents for evaluation following a fall. The patient reports that he was bending over to pick something up when he fell to the ground. Thankfully he did not hit his head or lose consciousness. He was unable to get up due leg weakness, which has been an ongoing issue for him, and so he was down for about 15 minutes until someone saw him and called EMS. Here in the ED, the patient endorses just feeling moderately weaker than his baseline. Denies any chest pain.     Review of Systems   Cardiovascular: Negative for chest pain.   Neurological: Positive for weakness. Negative for syncope.   All other systems reviewed and are negative.    Allergies  The patient has no known allergies.     Medications  Lisinopril  Fosamax  Selenium    Past Medical History  Hypertension    Past Surgical History  Appendectomy  Bone marrow biopsy  Cholecystectomy  Hernia repair  Left ankle fracture  Cornea surgery  Right lung lobectomy  Vitrectomy parsplana with 23 gauge system    Social History  Presents to the ED alone.     Physical Exam     Patient Vitals for the past 24 hrs:   BP Temp Temp src Pulse Resp SpO2 Height Weight   04/30/21 1430 (!) 126/103 -- -- 65 27 96 % -- --   04/30/21 1420 134/60 -- -- 61 18 95 % -- --   04/30/21 1400 (!) 71/45 -- -- 65 17 (!) 77 % -- --   04/30/21 1359 (!) 144/57 -- -- -- -- -- -- --   04/30/21 1354 -- 98.1  F (36.7  C) Oral -- -- -- 1.803 m (5' 11\") 65.3 kg (144 lb)   04/30/21 1310 105/54 -- -- 72 20 (!) 81 % -- --     Physical Exam  General: No acute distress.   Head:  Scalp is atraumatic.  Eyes:  The pupils are equal, round, and reactive to light. Normal conjunctiva.   ENT:                                      Ears:  The external ears are normal.   Nose:  The external nose is normal.  Throat:  The oropharynx is normal. Mucus membranes are moist.                 Neck:  Normal range of motion. "   CV:  Normal rate. No murmur. 2+ radial pulses  Resp:  Breath sounds are clear bilaterally. Non-labored, no retractions or accessory muscle use.  GI:  Abdomen is soft, no distension, no tenderness.   MS:  Normal range of motion. No acute deformities. No midline cervical, thoracic, lumbar tenderness.   Skin:  Warm and dry. No rash. Skin tear to the left arm. Diffuse ecchymosis to bilateral arms.   Neuro:   Alert. Strength and sensation grossly intact.   Psych: Awake. Alert.  Appropriate interactions.     Emergency Department Course   ECG:   ECG taken at 1321, ECG read at 1324  Atrial fibrillation. Incomplete right bundle branch block . Nonspecific ST abnormality. Prolonged QT. Abnormal ECG.   Rate 70 bpm. IA interval * ms. QRS duration 94 ms. QT/QTc 468/505 ms. P-R-T axes * 66 60.    ECG taken at 1443, ECG read at 1450  Sinus tachycardia with blocked premature atrial complexes. Right bundle branch block. Abnormal ECG.   Compared to EKG from earlier today.  Rate 66 bpm. IA interval 172 ms. QRS duration 118 ms. QT/QTc 476/499 ms. P-R-T axes 64 73 62.    Laboratory:  CBC: WBC: 10.0, HGB: 12.1 (L), PLT: 219  BMP: Glucose 122 (H), Creatinine 1.68 (H), GFR estimate 35 (L), o/w WNL  TSH with free T4 reflex: 0.94    UA reflex to microscopic and culture: glucose 50 (A), ketones 20 (A), protein albumin 20 (A), mucous present (A), uric acid crystals moderate (A), o/w WNL    Emergency Department Course:  Reviewed:  I reviewed nursing notes, vitals and past medical history    Assessments:  1315 I physically examined the patient as documented above.    1503 I rechecked the patient.     1535 I spoke with the patient after talking with his PCP.     1722 nurse let me that the patient was trying to leave the room without his walker and fell.  He fell onto his knees and was unable to get back up.  It appears he fell into the wall and lightly bumped his head.    Consult:   1528 I spoke with the patient's primary care physician  regarding the history and presentation here in the emergency department.    Left a voicemail on the patient's daughter's phone discussing plan for discharge.  Nurse will call the patient's daughter and update on plan for admission.    Interventions:  1417 NS 1L IV    Disposition:  The patient will be admitted to observation.    Impression & Plan   Medical Decision Making:  Praveen Velasquez is a 89 year old male presents to the emergency department after a mechanical fall.  The patient reports he bent down and lost his balance and was unable to get back up.  A neighbor attempted to help him, though they were unable to get him up so they called paramedics and he presents emergency department.  He denies striking his head and there is no sign of head trauma.  Patient was feeling more weak, though after discussing with primary care provider it appears these falls are occurring more frequently.  Primary care provider and family are in the process of contacting social work and getting assistance for the patient.  Creatinine 1.68, similar to past per primary care provider.  Otherwise blood work reassuring.  Urine without sign of infection.  I discussed admission to observation with the patient for social work consult, though the patient adamantly declined noting he feels safe going home.      The patient was discharged and waiting for his ride when he attempted to leave the room and walk in the hallway without his walker.  He had another fall.  No sign of trauma with this fall and it appears he lightly bumped his head on the wall.  No indication for CT imaging.  I again spoke with the patient emphasizing that I am worried about him going home as he has a risk of falling and hurting self, though he adamantly wishes to go home.  I called and left a voicemail with the patient's daughter and recommended frequent checks on her father.  Emphasized importance of close follow-up with his primary care provider.    Addendum:   The  patient changed his mind and wishes to be admitted as he does not feel safe going home.  I believe this is definitely in the patient's best interest as I am worried about him going home and falling and hurting himself.    Diagnosis:    ICD-10-CM    1. Fall, initial encounter  W19.XXXA TSH with free T4 reflex     UA reflex to Microscopic and Culture   2. Chronic kidney disease  N18.9      Scribe Disclosure:  I, Guy Darling, am serving as a scribe at 1:15 PM on 4/30/2021 to document services personally performed by Betty Harrell PA-C based on my observations and the provider's statements to me.      Betty Harrell PA-C  04/30/21 1728       Betty Harrell PA-C  04/30/21 1741

## 2021-04-30 NOTE — ED NOTES
This RN observed the patient walking into the hallway unassisted. I yelled for him to just hold on as I ran over to him and he shuffled his feet backwards and fell. This RN did not observe the fall as the patient was out of view behind the wall. Rn requested assistance from 2 others. We ambulated patient back to bed. Patient sustained a small skin tear to the back of his left elbow. Patient has 6 skin tears in various stages of healing on that arm.

## 2021-04-30 NOTE — ED NOTES
[facility]  ED Nurse Handoff Report    ED Chief complaint: Fall    ED Diagnosis:   Final diagnoses:   Fall, initial encounter   Chronic kidney disease       Code Status: Full Code    Allergies: No Known Allergies    Patient Story: Per EMS: Patient fell while getting something off of the ground. Layed in yard for 15 minutes before moraima saw patient and tried to help him up. Unable to lift him, moraima called 911.  Focused Assessment:  Patient confused and has unsteady gait. Also impulsive so would do well in room close to nurses station. Patient was set to discharge and fell while leaving impulsively and unassisted.    Treatments and/or interventions provided: Fluids.  Patient's response to treatments and/or interventions: Positive.    To be done/followed up on inpatient unit:  See orders    Does this patient have any cognitive concerns?: Short term memory loss    Activity level - Baseline/Home:  Walker  Activity Level - Current:   Stand with Assist and walker    Patient's Preferred language: English   Needed?: No    Isolation: None  Infection: Not Applicable  Patient tested for COVID 19 prior to admission: YES  Bariatric?: No    Vital Signs:   Vitals:    04/30/21 1400 04/30/21 1420 04/30/21 1430 04/30/21 1530   BP: (!) 71/45 134/60 (!) 126/103 137/55   Pulse: 65 61 65 59   Resp: 17 18 27    Temp:       TempSrc:       SpO2: (!) 77% 95% 96% 98%   Weight:       Height:           Cardiac Rhythm:     Was the PSS-3 completed:   Yes  What interventions are required if any?               Family Comments: No family present.  OBS brochure/video discussed/provided to patient/family: N/A              Name of person given brochure if not patient: N/A              Relationship to patient: N/A    For the majority of the shift this patient's behavior was Green.   Behavioral interventions performed were None.    ED NURSE PHONE NUMBER: [phone]

## 2021-04-30 NOTE — ED TRIAGE NOTES
Patient fell this am while leaning over to pick something off the ground. States that he did not hit his head. Was down for about 15 minutes until someone driving by saw him on the ground and tried to help him stand, he was unable to so they called 911. EMS states that patient is unstable on his feet. EMS also reports patient is in a-fib which the patient is unaware of.

## 2021-04-30 NOTE — ED NOTES
Bed: ED30  Expected date:   Expected time:   Means of arrival:   Comments:  Stephanie - 89 M fall eta [id number]

## 2021-05-01 ENCOUNTER — APPOINTMENT (OUTPATIENT)
Dept: OCCUPATIONAL THERAPY | Facility: CLINIC | Age: 86
End: 2021-05-01
Attending: HOSPITALIST
Payer: COMMERCIAL

## 2021-05-01 ENCOUNTER — APPOINTMENT (OUTPATIENT)
Dept: CARDIOLOGY | Facility: CLINIC | Age: 86
End: 2021-05-01
Attending: HOSPITALIST
Payer: COMMERCIAL

## 2021-05-01 ENCOUNTER — APPOINTMENT (OUTPATIENT)
Dept: PHYSICAL THERAPY | Facility: CLINIC | Age: 86
End: 2021-05-01
Attending: HOSPITALIST
Payer: COMMERCIAL

## 2021-05-01 LAB
ALBUMIN SERPL-MCNC: 3.1 G/DL (ref 3.4–5)
ALBUMIN UR-MCNC: NEGATIVE MG/DL
ALP SERPL-CCNC: 53 U/L (ref 40–150)
ALT SERPL W P-5'-P-CCNC: 40 U/L (ref 0–70)
ANION GAP SERPL CALCULATED.3IONS-SCNC: 6 MMOL/L (ref 3–14)
APPEARANCE UR: ABNORMAL
AST SERPL W P-5'-P-CCNC: 45 U/L (ref 0–45)
BILIRUB SERPL-MCNC: 1.5 MG/DL (ref 0.2–1.3)
BILIRUB UR QL STRIP: NEGATIVE
BUN SERPL-MCNC: 32 MG/DL (ref 7–30)
CALCIUM SERPL-MCNC: 8.6 MG/DL (ref 8.5–10.1)
CHLORIDE SERPL-SCNC: 108 MMOL/L (ref 94–109)
CK SERPL-CCNC: 539 U/L (ref 30–300)
CO2 SERPL-SCNC: 24 MMOL/L (ref 20–32)
COLOR UR AUTO: YELLOW
CREAT SERPL-MCNC: 1.59 MG/DL (ref 0.66–1.25)
FOLATE SERPL-MCNC: 5.2 NG/ML
GFR SERPL CREATININE-BSD FRML MDRD: 38 ML/MIN/{1.73_M2}
GLUCOSE SERPL-MCNC: 103 MG/DL (ref 70–99)
GLUCOSE UR STRIP-MCNC: 30 MG/DL
HGB BLD-MCNC: 10.4 G/DL (ref 13.3–17.7)
HGB UR QL STRIP: NEGATIVE
KETONES UR STRIP-MCNC: 20 MG/DL
LEUKOCYTE ESTERASE UR QL STRIP: NEGATIVE
MAGNESIUM SERPL-MCNC: 2 MG/DL (ref 1.6–2.3)
MUCOUS THREADS #/AREA URNS LPF: PRESENT /LPF
NITRATE UR QL: NEGATIVE
PH UR STRIP: 5.5 PH (ref 5–7)
POTASSIUM SERPL-SCNC: 4.5 MMOL/L (ref 3.4–5.3)
PROT SERPL-MCNC: 5.8 G/DL (ref 6.8–8.8)
RBC #/AREA URNS AUTO: <1 /HPF (ref 0–2)
SODIUM SERPL-SCNC: 138 MMOL/L (ref 133–144)
SOURCE: ABNORMAL
SP GR UR STRIP: 1.02 (ref 1–1.03)
SQUAMOUS #/AREA URNS AUTO: 0 /HPF (ref 0–1)
URATE CRY #/AREA URNS HPF: ABNORMAL /HPF
UROBILINOGEN UR STRIP-MCNC: 0 MG/DL (ref 0–2)
VIT B12 SERPL-MCNC: 377 PG/ML (ref 193–986)
WBC #/AREA URNS AUTO: <1 /HPF (ref 0–5)

## 2021-05-01 PROCEDURE — 97116 GAIT TRAINING THERAPY: CPT | Mod: GP

## 2021-05-01 PROCEDURE — 258N000003 HC RX IP 258 OP 636: Performed by: HOSPITALIST

## 2021-05-01 PROCEDURE — 99226 PR SUBSEQUENT OBSERVATION CARE,LEVEL III: CPT | Performed by: HOSPITALIST

## 2021-05-01 PROCEDURE — 93306 TTE W/DOPPLER COMPLETE: CPT

## 2021-05-01 PROCEDURE — 97165 OT EVAL LOW COMPLEX 30 MIN: CPT | Mod: GO

## 2021-05-01 PROCEDURE — 97530 THERAPEUTIC ACTIVITIES: CPT | Mod: GO

## 2021-05-01 PROCEDURE — 80053 COMPREHEN METABOLIC PANEL: CPT | Performed by: HOSPITALIST

## 2021-05-01 PROCEDURE — 97535 SELF CARE MNGMENT TRAINING: CPT | Mod: GO

## 2021-05-01 PROCEDURE — G0378 HOSPITAL OBSERVATION PER HR: HCPCS

## 2021-05-01 PROCEDURE — 81001 URINALYSIS AUTO W/SCOPE: CPT | Performed by: HOSPITALIST

## 2021-05-01 PROCEDURE — 97162 PT EVAL MOD COMPLEX 30 MIN: CPT | Mod: GP

## 2021-05-01 PROCEDURE — 250N000013 HC RX MED GY IP 250 OP 250 PS 637: Performed by: HOSPITALIST

## 2021-05-01 PROCEDURE — 93306 TTE W/DOPPLER COMPLETE: CPT | Mod: 26 | Performed by: INTERNAL MEDICINE

## 2021-05-01 PROCEDURE — 82550 ASSAY OF CK (CPK): CPT | Performed by: HOSPITALIST

## 2021-05-01 PROCEDURE — 36415 COLL VENOUS BLD VENIPUNCTURE: CPT | Performed by: HOSPITALIST

## 2021-05-01 PROCEDURE — 83735 ASSAY OF MAGNESIUM: CPT | Performed by: HOSPITALIST

## 2021-05-01 PROCEDURE — 85018 HEMOGLOBIN: CPT | Performed by: HOSPITALIST

## 2021-05-01 RX ORDER — SODIUM CHLORIDE 9 MG/ML
INJECTION, SOLUTION INTRAVENOUS CONTINUOUS
Status: DISCONTINUED | OUTPATIENT
Start: 2021-05-01 | End: 2021-05-01

## 2021-05-01 RX ORDER — LABETALOL HYDROCHLORIDE 5 MG/ML
10 INJECTION, SOLUTION INTRAVENOUS
Status: DISCONTINUED | OUTPATIENT
Start: 2021-05-01 | End: 2021-05-04 | Stop reason: HOSPADM

## 2021-05-01 RX ORDER — FOLIC ACID 1 MG/1
1 TABLET ORAL DAILY
Status: DISCONTINUED | OUTPATIENT
Start: 2021-05-01 | End: 2021-05-04 | Stop reason: HOSPADM

## 2021-05-01 RX ORDER — GABAPENTIN 100 MG/1
100 CAPSULE ORAL 3 TIMES DAILY
Status: DISCONTINUED | OUTPATIENT
Start: 2021-05-01 | End: 2021-05-03

## 2021-05-01 RX ORDER — IPRATROPIUM BROMIDE 21 UG/1
1 SPRAY, METERED NASAL EVERY EVENING
COMMUNITY

## 2021-05-01 RX ADMIN — SODIUM CHLORIDE: 9 INJECTION, SOLUTION INTRAVENOUS at 10:17

## 2021-05-01 RX ADMIN — GABAPENTIN 100 MG: 100 CAPSULE ORAL at 20:07

## 2021-05-01 RX ADMIN — FOLIC ACID 1 MG: 1 TABLET ORAL at 14:32

## 2021-05-01 RX ADMIN — GABAPENTIN 100 MG: 100 CAPSULE ORAL at 14:32

## 2021-05-01 ASSESSMENT — ACTIVITIES OF DAILY LIVING (ADL): PREVIOUS_RESPONSIBILITIES: MEAL PREP;HOUSEKEEPING;LAUNDRY;MEDICATION MANAGEMENT;FINANCES

## 2021-05-01 NOTE — PROGRESS NOTES
[facility]    Hospitalist Progress Note    Date of Admission:  4/30/2021    Assessment & Plan       Praveen Velasquez is a 89 year old  male with medical history of frequent falls, hypertension and long-term alcohol abuse  presented to the ED with fall.     Frequent falls with unsteady gait from physical deconditioning.  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  Concern for cognitive impairment.  Elevated CK, mild rhabdomyolysis  Patient reports was bending over to pick something up, fell to the ground, was down for about 15 minutes until someone driving by saw him up on the ground and try to help him stand, he was unable to help so they called 911.  Patient unsteady on his feet,  recommend fall in the ED noted.  Patient notes that he has fallen 3 times over the last 1 week.  Denies any presyncopal symptoms.  Denies any head injury or loss of consciousness.  On review of chart patient lives alone at home, history of frequent falls, declined home care in the past, declined transition to assisted living facility.  Did call and speak with his daughter, Deann.  She expresses significant concerns for his heavy alcohol use and concern for him going into DTs.  She is very concerned about him living alone.  Apparently home health was set up to be started next week.  WBC 10.0, creatinine 1.68.  TSH is 0.94.  Glucose 122.  UA with ketones, uric acid crystals present.  Admit to observation unit.  CK mildly elevated at 539 indicating mild  rhabdomyolysis.  Continue IV hydration.  Folate reduced at 5.2.  Begin folate supplement.  B12 adequate.  Physical therapy evaluation for falls.   OT for cognitive assessment.  If concern for cognitive impairment and patient continues to resist higher level of care will need to consider psychiatry for decision-making capacity.  Fall precautions.     Atrial fibrillation-not on anticoagulation.  Chronic diastolic heart failure with mild leg  edema.  Patient denies any chest pain or palpitation.  EMS reported atrial fibrillation which patient was unaware of.  In ED EKG atrial fibrillation, incomplete RBBB.  Heart rate 70, QTc 499.  TSH and potassium and magnesium within normal limits.  As needed IV metoprolol ordered.  Telemetry monitoring.  Echocardiogram shows normal LVEF at 60 to 65%, early diastolic dysfunction noted.     Hypertension.    Hold PTA lisinopril in the setting of acute on chronic kidney injury  As needed IV hydralazine ordered.     Acute on chronic kidney injury, improving.  CKD stage III.  Mild rhabdomyolysis  Per ED team discussion with PCP last creatinine 1.4 earlier this month.  Creatinine 1.68.  Unable to view labs done through PCP.  Continue gentle hydration with IV NS.  Follow BMP.  Avoid nephrotoxic drugs.     Alcohol use disorder  Patient's daughter expressed concerns with patient's significant alcohol use history.  States he has drank for the last 70 years.  Apparently he drinks about 1.5 L of rum every 5 days.  Per daughter, he does not have history of DTs or seizures.  Ethanol level 0.02 at admission.  LFTs okay, mildly elevated bilirubin at 1.5.  Discussed need to cut down on alcohol use.  Monitor closely for signs of alcohol withdrawal.  Started on 100 mg 3 times daily gabapentin to manage both pain as well as possibly decrease risk of going into alcohol withdrawal.     History of monoclonal gammopathy with anemia and CKD.  Anemia.  Hemoglobin 12.  Denies any bleeding at this time.  Unsure of baseline.  Defer anemia work-up to outpatient.  Hold PTA vitamins while under observation status.    ?  Recent cellulitis around skin tear over left arm  Per daughter's report.  Apparently patient seen by PCP 5 days ago and started on 5-day course of Bactrim.  Per med rec, apparently has 1 to 2 pills left of this.   -We will need to reexamine this area as patient did not see anything about this nor is there any mention regarding this  in the chart.    DVT Prophylaxis: SCD  Code Status:Full code.     Disposition:  Expected discharge whenever safe discharge plan in place following assessment by therapists.    Total time spent in patient encounter: 35 minutes.  Greater than 50% time spent in patient and family counseling regarding current diagnosis and work-up, plans for therapy evaluation and placement and extensive chart review.    Alexa Levy MD  Text Page (7am - 6pm, M-F)    Interval History   Patient has remained stable overnight.  This morning he is alert and conversing appropriately.  Complains of some intermittent pain behind his right shoulder but is able to move his shoulder freely and his neck freely.  States that pain comes on when he coughs hard.  No fevers.  No headache or abdominal pain.    -Data reviewed today: I reviewed all new labs and imaging results over the last 24 hours. I personally reviewed EKG with result as noted above    Physical Exam   Temp: 96.1  F (35.6  C) Temp src: Oral BP: (!) 160/63 Pulse: 74   Resp: 20 SpO2: 94 % O2 Device: None (Room air)    Vitals:    04/30/21 1354   Weight: 65.3 kg (144 lb)     Vital Signs with Ranges  Temp:  [96.1  F (35.6  C)-98.5  F (36.9  C)] 96.1  F (35.6  C)  Pulse:  [59-86] 74  Resp:  [17-27] 20  BP: ()/() 160/63  SpO2:  [77 %-98 %] 94 %  No intake/output data recorded.    Constitutional: Alert, appears comfortable, in no acute distress, appears frail  Respiratory: Non labored breathing, clear to auscultation bilaterally, no crackles or wheezes  Cardiovascular: Heart sounds regular rate and rhythm, no murmurs, bilateral 1+ leg edema  GI: Abdomen is soft, non distended, non tender. Normal BS  Skin/Integumen: Scattered bruising and skin tears  Neuro: alert, converses appropriately, moving all extremities, fluent speech, no facial asymmetry  Psych: mood and affect appropriate , alert and oriented x3    Medications     sodium chloride 100 mL/hr at 05/01/21 1017        gabapentin  100 mg Oral TID       Data   Recent Labs   Lab 21  0612 21  2134 21  1311   WBC  --   --  10.0   HGB 10.4*  --  12.1*   MCV  --   --  99   PLT  --   --  219     --  135   POTASSIUM 4.5  --  4.5   CHLORIDE 108  --  104   CO2 24  --  20   BUN 32*  --  30   CR 1.59*  --  1.68*   ANIONGAP 6  --  11   SUDHEER 8.6  --  9.0   *  --  122*   ALBUMIN 3.1*  --   --    PROTTOTAL 5.8*  --   --    BILITOTAL 1.5*  --   --    ALKPHOS 53  --   --    ALT 40  --   --    AST 45  --   --    TROPI  --  0.032  --        Imaging  Recent Results (from the past 24 hour(s))   Echocardiogram Complete    Narrative    [id number]  [id number]  [id number]  [id number]^JACKSON^SHANTI     [facility]  Echocardiography Laboratory  [address] [place] [address]     Name: MIHAI HAMM  MRN: [id number]  : 12/10/1930  Study Date: 2021 09:01 AM  Age: 89 yrs  Gender: Male  Patient Location: [facility]  Reason For Study: Atrial Fibrillation  Ordering Physician: SHANTI PAZ  Referring Physician: Sameer Brand  Performed By: Kandy Barnett RDCS     BSA: 1.8 m2  Height: 71 in  Weight: 144 lb  HR: 60  BP: 137/55 mmHg  ______________________________________________________________________________  Procedure  Complete Portable Echo Adult.  ______________________________________________________________________________  Interpretation Summary     1. The left ventricle is normal in size. There is normal left ventricular wall  thickness. Left ventricular systolic function is normal. The visual ejection  fraction is estimated at 60-65%. Grade I or early diastolic dysfunction.  Diastolic Doppler findings (E/E' ratio and/or other parameters) suggest left  ventricular filling pressures are increased. No regional wall motion  abnormalities noted.  2. The right ventricle is normal size. The right ventricular systolic function  is normal.  3. The left atrium is mildly dilated. The right atrium is mildly  dilated.  4. Mild mitral stenosis.  5. Mild aortic stenosis.  6. No pericardial effusion.  7. No previous study for comparison.  ______________________________________________________________________________  Left Ventricle  The left ventricle is normal in size. There is normal left ventricular wall  thickness. Left ventricular systolic function is normal. The visual ejection  fraction is estimated at 60-65%. Grade I or early diastolic dysfunction.  Diastolic Doppler findings (E/E' ratio and/or other parameters) suggest left  ventricular filling pressures are increased. No regional wall motion  abnormalities noted.     Right Ventricle  The right ventricle is normal size. The right ventricular systolic function is  normal.     Atria  The left atrium is mildly dilated. The right atrium is mildly dilated. There  is no color Doppler evidence of an atrial shunt.     Mitral Valve  There is mild mitral annular calcification. There is trace mitral  regurgitation. There is mild mitral stenosis. The mean mitral valve gradient  is 5.2 mmHg. The peak mitral valve gradient is 10.1 mmHg. (Recorded at a heart  rate of 66 bpm).     Tricuspid Valve  There is trace tricuspid regurgitation. The right ventricular systolic  pressure is approximated at 21.7 mmHg plus the right atrial pressure.     Aortic Valve  The aortic valve is not well visualized. There is trace to mild aortic  regurgitation. Mild valvular aortic stenosis. The peak AoV pressure gradient  is 27.2 mmHg. The mean AoV pressure gradient is 12.0 mmHg. The calculated  aortic valve are is 1.7 cm^2.     Pulmonic Valve  There is no pulmonic valvular stenosis.     Vessels  The aortic root is normal size. Dilation of the inferior vena cava is present  with normal respiratory variation in diameter.     Pericardium  There is no pericardial effusion.     Rhythm  Sinus rhythm was noted.  ______________________________________________________________________________  MMode/2D  Measurements & Calculations  IVSd: 0.98 cm     LVIDd: 3.5 cm  LVIDs: 2.6 cm  LVPWd: 0.90 cm  FS: 24.5 %  LV mass(C)d: 93.1 grams  LV mass(C)dI: 50.8 grams/m2  Ao root diam: 3.6 cm  LA dimension: 4.5 cm  LA/Ao: 1.3  LVOT diam: 2.2 cm  LVOT area: 3.8 cm2  LA Volume (BP): 60.5 ml  LA Volume Index (BP): 33.1 ml/m2  RWT: 0.52     Doppler Measurements & Calculations  MV E max kuldip: 100.0 cm/sec  MV A max kuldip: 131.9 cm/sec  MV E/A: 0.76  MV max PG: 10.1 mmHg  MV mean P.2 mmHg  MV V2 VTI: 33.5 cm  MVA(VTI): 2.8 cm2  MV dec time: 0.37 sec  Ao V2 max: 260.6 cm/sec  Ao max P.2 mmHg  Ao V2 mean: 158.6 cm/sec  Ao mean P.0 mmHg  Ao V2 VTI: 54.7 cm  SHA(I,D): 1.7 cm2  SHA(V,D): 1.9 cm2  LV V1 max P.2 mmHg  LV V1 max: 134.0 cm/sec  LV V1 VTI: 25.2 cm  SV(LVOT): 95.2 ml  SI(LVOT): 51.9 ml/m2  TR max kuldip: 232.8 cm/sec  TR max P.7 mmHg  AV Kuldip Ratio (DI): 0.51  SHA Index (cm2/m2): 0.95  E/E' av.4  Lateral E/e': 18.8  Medial E/e': 20.0     ______________________________________________________________________________  Report approved by: Braeden Govea 2021 01:16 PM

## 2021-05-01 NOTE — PROGRESS NOTES
Observation goals PRIOR TO DISCHARGE    Comments:   -diagnostic tests and consults completed and resulted: Not met    -vital signs normal or at patient baseline : Met    -tolerating oral intake to maintain hydration : Met    Nurse to notify provider when observation goals have been met and patient is ready for discharge.

## 2021-05-01 NOTE — PROGRESS NOTES
Observation Goals:    -diagnostic tests and consults completed and resulted NOT MET; UA still needs to be collected  -vital signs normal or at patient baseline MET  -tolerating oral intake to maintain hydration MET

## 2021-05-01 NOTE — PROGRESS NOTES
[facility]      OUTPATIENT OCCUPATIONAL THERAPY  EVALUATION  PLAN OF TREATMENT FOR OUTPATIENT REHABILITATION  (COMPLETE FOR INITIAL CLAIMS ONLY)  Patient's Last Name, First Name, M.I.  YOB: 1930  Praveen Velasquez                          Provider's Name  [facility] Medical Record No.  [id number]                               Onset Date:  04/30/21   Start of Care Date:  05/01/21     Type:     ___PT   _X_OT   ___SLP Medical Diagnosis:  frequest falls, hypertension                         OT Diagnosis:  decreased independence in ADLs/IADLs    Visits from SOC:  1   _________________________________________________________________________________  Plan of Treatment/Functional Goals    Planned Interventions: ADL retraining, IADL retraining, balance training, cognition, strengthening, transfer training, home program guidelines, progressive activity/exercise   Goals: See Occupational Therapy Goals on Care Plan in [facility] electronic health record.    Therapy Frequency: 4x/week  Predicted Duration of Therapy Intervention: 4 days   _________________________________________________________________________________    I CERTIFY THE NEED FOR THESE SERVICES FURNISHED UNDER        THIS PLAN OF TREATMENT AND WHILE UNDER MY CARE     (Physician co-signature of this document indicates review and certification of the therapy plan).                Certification date from: 05/01/21, Certification date to: 05/31/21    Referring Physician: Angel Fontanez MD            Initial Assessment        See Occupational Therapy evaluation dated 05/01/21 in Epic electronic health record.

## 2021-05-01 NOTE — PROVIDER NOTIFICATION
MD Notification    Notified Person: MD    Notified Person Name:Dr. Levy     Notification Date/Time:05/01/21  2139    Notification Interaction:web paged    Purpose of Notification:Requesting MD to call patient's daughter who has concerns about his alcohol intake and the possibility of his going through DT's.

## 2021-05-01 NOTE — H&P
[facility]    History and Physical  Hospitalist    Praveen Velasquez MRN# [id number]   Age: 89 year old YOB: 1930     Date of Admission:  4/30/2021    Primary care provider: Sameer Brand          Assessment and Plan:     Praveen Velasquez is a 89 year old  male with medical history of frequent falls, hypertension presented to the ED with fall.    Frequent falls with unsteady gait from physical deconditioning.  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  Concern for cognitive impairment.  Patient reports was bending over to pick something up, fell to the ground, was down for about 15 minutes until someone driving by saw him up on the ground and try to help him stand, he was unable to help so they called 911.  Patient unsteady on his feet, fall in the ED noted.  On review of chart patient lives alone at home, history of frequent falls, declined home care in the past, declined transition to assisted living facility.  WBC 10.0, creatinine 1.68.  TSH is 0.94.  Glucose 122.  UA with ketones, uric acid crystals present.  Admit to observation unit.  Will check TSH, CK, vitamin B12 and folic acid levels.  Physical therapy evaluation for falls.   OT for cognitive assessment.  If concern for cognitive impairment and patient continues to resist higher level of care will need to consider psychiatry for decision-making capacity.  Fall precautions.    Atrial fibrillation-not on anticoagulation.  Patient denies any chest pain or palpitation.  EMS reported atrial fibrillation which patient was unaware of.  In ED EKG atrial fibrillation, incomplete RBBB.  Heart rate 70, QTc 499.  TSH and potassium within normal limits with.  We will check magnesium levels.  As needed IV metoprolol ordered.  Telemetry monitoring overnight.  Echocardiogram for evaluation of heart function.    Hypertension.    Hold PTA lisinopril in the setting of acute on chronic kidney injury  As needed IV  hydralazine ordered.    Acute on chronic kidney injury.  CKD stage III.  Per ED team discussion with PCP last creatinine 1.4 earlier this month.  Creatinine 1.68.  Gentle hydration with normal saline at 100 mL/h overnight, check BMP in AM.  Avoid nephrotoxic drugs.    Moderate alcohol use.  Patient reports has 2 highballs each with 1 and half shot every day.  check LFTs, CK level, vitamin B12 and folic acid levels.  Discussed need to cut down on alcohol use.    History of monoclonal gammopathy with anemia and CKD.  Anemia.  Hemoglobin 12.  Denies any bleeding at this time.  Unsure of baseline.  Defer anemia work-up to outpatient.  Hold PTA vitamins while under observation status.    Rule Out COVID-19 infection  This patient was evaluated during a global COVID-19 pandemic, which necessitated consideration that the patient might be at risk for infection with the SARS-CoV-2 virus that causes COVID-19. Applicable protocols for evaluation were followed during the patient's care. LOW suspicion for infection.     DVT Prophylaxis: SCD  Code Status:Full code.    Disposition: Expected discharge likely tomorrow pending safe discharge plan in place.  More than 60% of time spent in direct patient care, care coordination, patient counseling, and formalizing plan of care.   Discussed with patient and ED team.  Unable to reach patient's daughter.    Angel Fontanez MD          Chief Complaint:     History is obtained from patient and medical records.    Praveen Velasquez is a 89 year old  male with medical history of frequent falls, hypertension presented to the ED with fall.    Patient reports was bending over to pick something up, fell to the ground, was down for about 15 minutes until someone driving by saw him up on the ground and try to help him stand, he was unable to help so they called 911.  EMS reported that patient unstable on his feet, and also that patient was in A. Fib-patient unaware of it.    I have reviewed his chart,  declined home care, declined transition to assisted living facility and history of frequent falls.  Lives alone at home.    Patient appearing comfortable lying in bed.  Denies any chest pain.  Complains of generalized weakness.  Reports he feels fine and would like to go home.    Patient initially plan for discharge from the ED, was ambulating in the hallway unassisted, shuffled his feet backward and fell.  No head injury. On exam patient appears impulsive, report of unsteady gait with multiple skin tears.  Will admit to observation unit.    In ED blood pressure 105/54, heart rate 72, oxygen saturation in 90s on room air.  Skin tears present.  EKG atrial fibrillation, incomplete RBBB.  Heart rate 70, QTc 499.  WBC 10.0, creatinine 1.68.  TSH is 0.94.  Glucose 122.  UA with ketones, uric acid crystals present.          Review of Systems:     GENERAL: No weight changes, no fever or chills  EENT: No new vision changes, no sinus problems, no difficulty swallowing, no hearing difficulty  PULMONARY: No shortness of breath, no cough, no wheezing  CARDIAC: no chest pain, no irregular or fast heart beats   GI: No abdominal pain, nausea, vomiting, diarrhea, constipation, black or bloody stools  : No burning/pain with urination, no urgency, no hematuria.   NEURO: No significant headaches, no slurred speech, no seizures, no dizziness, no loss of consciousness  ENDOCRINE: No excessive thirst, no excessive bruising.  MUSCULOSKELETAL: No joint pain or swelling.  SKIN: No skin rashes  PSYCHIATRY no anxiety or depression    Medical History:     Past Medical History:   Diagnosis Date     Hypertension         Surgical History:      Past Surgical History:   Procedure Laterality Date     APPENDECTOMY       BONE MARROW BIOPSY, BONE SPECIMEN, NEEDLE/TROCAR N/A 11/18/2019    Procedure: BIOPSY, BONE MARROW;  Surgeon: Edward Leyva MD;  Location:  GI     CHOLECYSTECTOMY       HERNIA REPAIR       ORTHOPEDIC SURGERY      L ankle fx      PHACOEMULSIFICATION CLEAR CORNEA WITH STANDARD INTRAOCULAR LENS IMPLANT Right 2/17/2016    Procedure: PHACOEMULSIFICATION CLEAR CORNEA WITH STANDARD INTRAOCULAR LENS IMPLANT;  Surgeon: Frandy Weber MD;  Location: [facility]     THORACIC SURGERY      r lung lobectomy     VITRECTOMY PARSPLANA WITH 23 GAUGE SYSTEM Right 2/17/2016    Procedure: VITRECTOMY PARSPLANA WITH 23 GAUGE SYSTEM;  Surgeon: Deniz Ross MD;  Location: [facility]             Social History:      Social History     Tobacco Use     Smoking status: Current Some Day Smoker     Types: Cigarettes     Smokeless tobacco: Never Used   Substance Use Topics     Alcohol use: Yes     Comment: 1-2 per day             Family History:   Family history reviewed, no pertinent family history to HPI.         Allergies:   No Known Allergies          Medications:     Medications Prior to Admission   Medication Sig Dispense Refill Last Dose     Alendronate Sodium (FOSAMAX PO) Take 70 mg by mouth once a week         Ascorbic Acid (VITAMIN C PO)         calcium carbonate-vitamin D 600-400 MG-UNIT CHEW         Cholecalciferol (VITAMIN D3 PO) Take 1,000 Units by mouth daily        cyanocobalamin (VITAMIN B-12) 100 MCG tablet Take 100 mcg by mouth daily        lisinopril (ZESTRIL) 10 MG tablet Take 10 mg by mouth         selenium 50 MCG TABS Take 200 mcg by mouth daily        sulfamethoxazole-trimethoprim (BACTRIM DS) 800-160 MG tablet Take 1 tablet by mouth 2 times daily        VITAMIN E BLEND PO Take 400 Units by mouth                Physical Exam      Admission Weight: 65.3 kg (144 lb)    Vital Signs with Ranges  Temp:  [97.6  F (36.4  C)-98.1  F (36.7  C)] 97.6  F (36.4  C)  Pulse:  [59-77] 77  Resp:  [17-27] 22  BP: ()/() 137/55  SpO2:  [77 %-98 %] 89 %     PHYSICAL EXAM  GENERAL: Patient is in no distress.  Awake, alert and able to answer simple questions, hard of hearing.  HEENT: Oropharynx pink, moist. Pupils equal  HEART: Regular rate and rhythm.  S1S2.  Systolic murmur present.  LUNGS: Clear to auscultation bilaterally. No expiratory wheeze.  Respirations unlabored  ABDOMEN: Soft, no abdominal tenderness, bowel sounds heard   NEURO: Moving all extremities, no focal weakness.  EXTREMITIES: trace pedal edema.   SKIN: Warm, dry. + bruising.  Multiple skin tears.  PSYCHIATRY Cooperative         Data:   All new lab and imaging data was reviewed.

## 2021-05-01 NOTE — PROGRESS NOTES
RECEIVING UNIT ED HANDOFF REVIEW    ED Nurse Handoff Report was reviewed by: Jackie Cahn RN on April 30, 2021 at 7:24 PM

## 2021-05-01 NOTE — PROGRESS NOTES
"   05/01/21 1429   Quick Adds   Type of Visit Initial PT Evaluation   Living Environment   People in home alone   Current Living Arrangements house   Home Accessibility stairs to enter home   Living Environment Comments Pt reports living alone, needs to negotiate full flight of stairs to his living space, once upstairs needs met however needs to negotiate stairs whenever leaving home   Self-Care   Usual Activity Tolerance moderate   Current Activity Tolerance fair   Regular Exercise No   Activity/Exercise/Self-Care Comment Pt reports he \"sometimes\" uses his walker. has family member assist in delivering groceries    Disability/Function   Fall history within last six months yes   Number of times patient has fallen within last six months 3   Change in Functional Status Since Onset of Current Illness/Injury yes   General Information   Onset of Illness/Injury or Date of Surgery 04/30/21   Referring Physician Angel Fontanez MD   Pertinent History of Current Problem (include personal factors and/or comorbidities that impact the POC) Praveen Velasquez is a 89 year old  male with medical history of frequent falls, hypertension and long-term alcohol abuse  presented to the ED with fall.   Existing Precautions/Restrictions fall   General Observations Wears glasses, [place]    Cognition   Orientation Status (Cognition) person;place   Cognitive Status Comments OT following to address cognition see OT note    Pain Assessment   Patient Currently in Pain No   Posture    Posture Forward head position;Kyphosis;Scoliosis   Range of Motion (ROM)   ROM Comment BLE WFL   Strength   Strength Comments Pt demonstrates gross functional weakness, very effortful to stand from low chair, PT needs to stabilize chair as pt heavily bracing BLE    Bed Mobility   Comment (Bed Mobility) Not assessed   Transfers   Transfer Safety Comments STS with FWW and min A x 1, heavy use of UE support, posteiror lean   Gait/Stairs (Locomotion)   Comment (Gait/Stairs) " Pt ambulated 4' for evaluation, pt maintains unsafe distance from walker, short step length, low ft clearance noted.   Balance   Balance Comments Fair dynamic balance with use of UE support    Clinical Impression   Criteria for Skilled Therapeutic Intervention yes, treatment indicated   PT Diagnosis (PT) impaired IND with functional mobility from basleine   Influenced by the following impairments medical condition, weakness, balance, activity tolernace   Functional limitations due to impairments impaired IND with mobility from basleine   Clinical Presentation Evolving/Changing   Clinical Presentation Rationale lives alone, age, clinical judgement   Clinical Decision Making (Complexity) moderate complexity   Therapy Frequency (PT) 4x/week   Predicted Duration of Therapy Intervention (days/wks) 1 week   Planned Therapy Interventions (PT) balance training;gait training;stair training;strengthening;transfer training;postural re-education;patient/family education   Risk & Benefits of therapy have been explained evaluation/treatment results reviewed;care plan/treatment goals reviewed;risks/benefits reviewed;patient   PT Discharge Planning    PT Discharge Recommendation (DC Rec) Transitional Care Facility   PT Rationale for DC Rec Recommend TCU at [place] as pt requires A x 1 for safe mobility at this time. Pt has had frequent falls at home. Currently pt demonstrates gross functional weakness, impaired high level balance, impaired activity tolerance. Discussed recommendation at length however pt refusing at this time. If pt continues to refuse and does in fact DC to home recommend HHPT   Total Evaluation Time   Total Evaluation Time (Minutes) 15

## 2021-05-01 NOTE — PLAN OF CARE
OBSERVATION GOALS:     - Diagnostic tests and consults completed and resulted: Not met, still trying to get UA.    - Vital signs normal or at patient baseline: Met.    - Tolerating oral intake to maintain hydration: Met.      Pt is A&Ox4, generalized weakness, fell at home and was brought here by EMS 15 minutes later. Also suffered a fall in a hallway near ED. Is impulsive. VSS on RA. Regular diet. Tele is controlled afib. Has skin tears on L forearm that are dressed d/t bleeding. Ax1 w/ GB and walker. Denies pain this shift. Discharge pending agreement on place, Pt does not want to go to TCU or assisted living. Continent but has frequency. OT, PT, SW consults.

## 2021-05-01 NOTE — PHARMACY-ADMISSION MEDICATION HISTORY
Pharmacy Medication History  Admission medication history interview status for the 4/30/2021  admission is complete. See EPIC admission navigator for prior to admission medications     Location of Interview: Patient room  Medication history sources: Patient and Surescripts    Significant changes made to the medication list:  Added:   -Ipratropium nasal spray: added per patient and Surescripts    Removed:   -Alendronate: patient reports that he is not taking it.  -Vitamin C, calcium-vitamin D3, vitamin D3, vitamin B12, selenium, vitamin E: patient reports that he is no longer taking them.     In the past week, patient estimated taking medication this percent of the time: 50-90% due to patient forgets to take his medications    Additional medication history information:   -Patient seemed to be a reliable historian. He takes care of his own medications and reports that he only takes 3 medications. He used to take several dietary supplements, but he is no longer taking them.   -Bactrim DS: patient reports that he still has 1-2 pills left.     Medication reconciliation completed by provider prior to medication history? No    Time spent in this activity: 10 minutes    Prior to Admission medications    Medication Sig Last Dose Taking? Auth Provider   ipratropium (ATROVENT) 0.03 % nasal spray Spray 1 spray into both nostrils every evening 4/28/2021 at PM Yes Unknown, Entered By History   lisinopril (ZESTRIL) 10 MG tablet Take 10 mg by mouth daily  4/28/2021 at PM Yes Reported, Patient   sulfamethoxazole-trimethoprim (BACTRIM DS) 800-160 MG tablet Take 1 tablet by mouth 2 times daily For 5 days. 4/29/2021 Yes Unknown, Entered By History       The information provided in this note is only as accurate as the sources available at the time of update(s)

## 2021-05-01 NOTE — PLAN OF CARE
Pt is A&Ox4, VSS on RA expt for HTN, Reg diet, IV  ml/hr, Tele SB, Reports of back pain at times, managed Ice therapy and repositioned, Echo and PT completed, SW consult pending.

## 2021-05-01 NOTE — PROGRESS NOTES
"   05/01/21 1320   Quick Adds   Quick Adds Certification   Type of Visit Initial Occupational Therapy Evaluation   Living Environment   People in home alone   Current Living Arrangements house   Home Accessibility stairs to enter home;stairs within home   Living Environment Comments Patient reports living alone in home, 13 steps to navigate to enter home.   Self-Care   Usual Activity Tolerance fair   Current Activity Tolerance poor   Regular Exercise No   Equipment Currently Used at Home walker, rolling   Disability/Function   Number of times patient has fallen within last six months 2   General Information   Onset of Illness/Injury or Date of Surgery 04/30/21   Referring Physician Angel Fontanez MD   Patient/Family Therapy Goal Statement (OT) \"I have to get home\"    Additional Occupational Profile Info/Pertinent History of Current Problem 89 year old  male with medical history of frequent falls, hypertension presented to the ED with fall.   Existing Precautions/Restrictions fall   Cognitive Status Examination   Orientation Status orientation to person, place and time   Visual Perception   Visual Impairment/Limitations corrective lenses full-time   Sensory   Sensory Quick Adds No deficits were identified   Pain Assessment   Patient Currently in Pain No   Integumentary/Edema   Integumentary/Edema no deficits were identifed   Posture   Posture forward head position;scoliosis   Range of Motion Comprehensive   Comment, General Range of Motion L UE limited due to pain    Strength Comprehensive (MMT)   Comment, General Manual Muscle Testing (MMT) Assessment generalized weakness/deconditioning    Muscle Tone Assessment   Muscle Tone Quick Adds No deficits were identified   Coordination   Upper Extremity Coordination Left UE impaired   Bed Mobility   Bed Mobility supine-sit   Supine-Sit [place] (Bed Mobility) minimum assist (75% patient effort);set up;supervision;nonverbal cues (demo/gesture);verbal cues "   Assistive Device (Bed Mobility) bed rails   Transfers   Transfers transfer safety analysis;sit-stand transfer;bed-chair transfer   Transfer Skill: Bed to Chair/Chair to Bed   Bed-Chair [place] (Transfers) minimum assist (75% patient effort);set up;supervision;verbal cues   Assistive Device (Bed-Chair Transfers) standard walker   Sit-Stand Transfer   Sit-Stand [place] (Transfers) set up;supervision;verbal cues;minimum assist (75% patient effort)   Assistive Device (Sit-Stand Transfers) walker, front-wheeled   Instrumental Activities of Daily Living (IADL)   Previous Responsibilities meal prep;housekeeping;laundry;medication management;finances   Clinical Impression   Criteria for Skilled Therapeutic Interventions Met (OT) yes;meets criteria;skilled treatment is necessary   OT Diagnosis decreased independence in ADLs/IADLs    OT Problem List-Impairments impacting ADL problems related to;activity tolerance impaired;balance;cognition;coordination;flexibility;mobility;pain   Assessment of Occupational Performance 3-5 Performance Deficits   Identified Performance Deficits dressing, bathing, toileting, home management    Planned Therapy Interventions (OT) ADL retraining;IADL retraining;balance training;cognition;strengthening;transfer training;home program guidelines;progressive activity/exercise   Clinical Decision Making Complexity (OT) moderate complexity   Therapy Frequency (OT) 4x/week   Predicted Duration of Therapy 4 days    Risk & Benefits of therapy have been explained evaluation/treatment results reviewed;care plan/treatment goals reviewed;risks/benefits reviewed;current/potential barriers reviewed;participants voiced agreement with care plan;participants included;patient   OT Discharge Planning    OT Discharge Recommendation (DC Rec) Transitional Care Facility   OT Rationale for DC Rec Patient demo'ing deconditioning, decreased activity tolerance, impaired balance, and mild cognitive deficits that  "warrant further skilled therapy. Patient unsafe to return home and requires 24 hr supervision/assistance for fxnl mobility and safe transfers and safe participation with aDLs/IADLs. Patient initially receptive to this education/information, however then stated, \"I need to return home, I have things to do, I need to check phone calls\".    Therapy Certification   Start of Care Date 05/01/21   Certification date from 05/01/21   Certification date to 05/31/21   Medical Diagnosis frequest falls, hypertension      "

## 2021-05-02 LAB
ANION GAP SERPL CALCULATED.3IONS-SCNC: 5 MMOL/L (ref 3–14)
BUN SERPL-MCNC: 24 MG/DL (ref 7–30)
CALCIUM SERPL-MCNC: 8.8 MG/DL (ref 8.5–10.1)
CHLORIDE SERPL-SCNC: 105 MMOL/L (ref 94–109)
CK SERPL-CCNC: 518 U/L (ref 30–300)
CO2 SERPL-SCNC: 24 MMOL/L (ref 20–32)
CREAT SERPL-MCNC: 1.23 MG/DL (ref 0.66–1.25)
GFR SERPL CREATININE-BSD FRML MDRD: 51 ML/MIN/{1.73_M2}
GLUCOSE SERPL-MCNC: 109 MG/DL (ref 70–99)
POTASSIUM SERPL-SCNC: 4.2 MMOL/L (ref 3.4–5.3)
SODIUM SERPL-SCNC: 134 MMOL/L (ref 133–144)

## 2021-05-02 PROCEDURE — 82550 ASSAY OF CK (CPK): CPT | Performed by: HOSPITALIST

## 2021-05-02 PROCEDURE — G0378 HOSPITAL OBSERVATION PER HR: HCPCS

## 2021-05-02 PROCEDURE — 99226 PR SUBSEQUENT OBSERVATION CARE,LEVEL III: CPT | Performed by: HOSPITALIST

## 2021-05-02 PROCEDURE — 80048 BASIC METABOLIC PNL TOTAL CA: CPT | Performed by: HOSPITALIST

## 2021-05-02 PROCEDURE — 36415 COLL VENOUS BLD VENIPUNCTURE: CPT | Performed by: HOSPITALIST

## 2021-05-02 PROCEDURE — 250N000013 HC RX MED GY IP 250 OP 250 PS 637: Performed by: HOSPITALIST

## 2021-05-02 RX ORDER — LISINOPRIL 5 MG/1
5 TABLET ORAL DAILY
Status: DISCONTINUED | OUTPATIENT
Start: 2021-05-02 | End: 2021-05-03

## 2021-05-02 RX ADMIN — GABAPENTIN 100 MG: 100 CAPSULE ORAL at 08:22

## 2021-05-02 RX ADMIN — LISINOPRIL 5 MG: 5 TABLET ORAL at 13:12

## 2021-05-02 RX ADMIN — FOLIC ACID 1 MG: 1 TABLET ORAL at 08:22

## 2021-05-02 RX ADMIN — GABAPENTIN 100 MG: 100 CAPSULE ORAL at 13:12

## 2021-05-02 RX ADMIN — GABAPENTIN 100 MG: 100 CAPSULE ORAL at 19:34

## 2021-05-02 NOTE — UTILIZATION REVIEW
"  [facility] Utilization Review  Admission Status; Secondary Review Determination     Admission Date: 4/30/2021  1:01 PM      Under the authority of the Utilization Management Committee, the utilization review process indicated a secondary review on the above patient.  The review outcome is based on review of the medical records, discussions with staff, and applying clinical experience noted on the date of the review.        (X) Observation Status Appropriate - This patient does not meet hospital inpatient criteria and is placed in observation status. If this patient's primary payer is Medicare and was admitted as an inpatient, Condition Code 44 should be used and patient status changed to \"observation\".   () Observation Status concurrent Review           RATIONALE FOR DETERMINATION   89-year-old male with history of chronic kidney disease, frequent falls, long-term alcohol abuse, admitted with frequent falls and unsteady gait from physical deconditioning.  Patient was found on the floor for 15 minutes, is unsteady on the feet, has fallen 3 times over the past 1 week.  CK mildly elevated, initially started on IV fluids, now discontinued, also had mild acute kidney injury, which has resolved.  Started on folate supplementation for reduced levels.  Patient does have mild cognitive deficits that warrant skilled therapy, needs 24-hour supervision.  Patient recommended to go to TCU but appears to decline need psychiatry evaluation for assessment of decision-making capacity.  Complex patient who refuses TCU, needs  and psych assessments, for placement, decision-making capacity, no acute medical issues, does not meet criteria for inpatient stay, recommend continue observation status      The severity of illness, intensity of service provided, expected LOS make the care appropriate for observation status at this time.        The information on this document is developed by the utilization review " team in order for the business office to ensure compliance.  This only denotes the appropriateness of proper admission status and does not reflect the quality of care rendered.         The definitions of Inpatient Status and Observation Status used in making the determination above are those provided in the CMS Coverage Manual, Chapter 1 and Chapter 6, section 70.4.      Sincerely,       Heather Delarosa MD  Physician Advisor  Utilization Review-[place]    Phone: [phone]

## 2021-05-02 NOTE — PROGRESS NOTES
Observation goals PRIOR TO DISCHARGE    Comments:   -diagnostic tests and consults completed and resulted: Not met     -vital signs normal or at patient baseline : Met     -tolerating oral intake to maintain hydration : Met     Nurse to notify provider when observation goals have been met and patient is ready for discharge.    Pt is A&Ox4, forgetful and [place]. Expressed that he is feeling anxious and frustrated about having to stay in the hospital.  VSS on RA, however was placed on 2L NC overnight due to low O2 sats. Up A1, GB and walker. Denies pain. Has a nonproductive cough. PIV SL. Scattered bruising, R arm dressing CDI. Incontinent at times. Plan for SW, PT and OT consults today. Discharge pending placement.

## 2021-05-02 NOTE — PLAN OF CARE
A&Ox4, [place]. VSS on RA ex htn and tolerating regular diet. Up assist of 1 with gb/w. Tele: Dysrhythmia with 1st AVB and IV SL. Denies pain. PT, OT, Psych consult. Continue to monitor.

## 2021-05-02 NOTE — PROGRESS NOTES
[facility]    Hospitalist Progress Note    Date of Admission:  4/30/2021    Assessment & Plan       Praveen Velasquez is a 89 year old  male with medical history of frequent falls, hypertension and long-term alcohol abuse  presented to the ED with fall.     Frequent falls with unsteady gait from physical deconditioning.  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  Concern for cognitive impairment.  Elevated CK, mild rhabdomyolysis  Patient reports was bending over to pick something up, fell to the ground, was down for about 15 minutes until someone driving by saw him up on the ground and try to help him stand, he was unable to help so they called 911.  Patient unsteady on his feet,  recommend fall in the ED noted.  Patient notes that he has fallen 3 times over the last 1 week.  Denies any presyncopal symptoms.  Denies any head injury or loss of consciousness.  On review of chart patient lives alone at home, history of frequent falls, declined home care in the past, declined transition to assisted living facility.  Did call and speak with his daughter, Deann.  She expresses significant concerns for his heavy alcohol use and concern for him going into DTs.  She is very concerned about him living alone.  Apparently home health was set up to be started next week.  WBC 10.0, creatinine 1.68.  TSH is 0.94.  Glucose 122.  UA with ketones, uric acid crystals present.  Admit to observation unit.  CK mildly elevated at 539 indicating mild  rhabdomyolysis.  Improved with IV fluids.  IV fluids stopped on 5/2.  Folate reduced at 5.2.  Begin folate supplement.  B12 adequate.  PT and OT assessments both concluded need for TCU.  He does demonstrate mild cognitive deficits that warrant further skilled therapy.  Needs 24-hour supervision/assistance for functional mobility, safe participation with ADLs.  Patient initially seems receptive to TCU option but repeatedly states that he needs to go  home and appears to decline TCU.  Will request psychiatry consult for assessment of decision-making capacity.  Had a long conversation with patient's daughter, Deann regarding current challenges with discharge planning.  She had her brother would strongly prefer patient be placed in a TCU as they share concerns for his safety at home alone.     Atrial fibrillation-not on anticoagulation.  Chronic diastolic heart failure with mild leg edema.  Patient denies any chest pain or palpitation.  EMS reported atrial fibrillation which patient was unaware of.  In ED EKG atrial fibrillation, incomplete RBBB.  Heart rate 70, QTc 499.  TSH and potassium and magnesium within normal limits.  As needed IV metoprolol ordered.  Telemetry monitoring.  Echocardiogram shows normal LVEF at 60 to 65%, early diastolic dysfunction noted.     Hypertension.    Initially held lisinopril with MILA.  MILA resolved.  Restarted lisinopril at 5 mg daily [half dose].     Acute on chronic kidney injury, improving.  CKD stage III.  Mild rhabdomyolysis  Per ED team discussion with PCP last creatinine 1.4 earlier this month.  Creatinine 1.68.  Unable to view labs done through PCP.  Creatinine has improved with IV fluids.  Resume lisinopril at half dose [5 mg daily].  Avoid nephrotoxic drugs.     Alcohol use disorder  Patient's daughter expressed concerns with patient's significant alcohol use history.  States he has drank for the last 70 years.  Apparently he drinks about 1.5 L of rum every 5 days.  Per daughter, he does not have history of DTs or seizures.  Ethanol level 0.02 at admission.  LFTs okay, mildly elevated bilirubin at 1.5.  Discussed need to cut down on alcohol use.  Monitor closely for signs of alcohol withdrawal.  Started on 100 mg 3 times daily gabapentin to manage both pain as well as possibly decrease risk of going into alcohol withdrawal.     History of monoclonal gammopathy with anemia and CKD.  Anemia.  Hemoglobin 12.  Denies any  "bleeding at this time.  Unsure of baseline.  Defer anemia work-up to outpatient.  Hold PTA vitamins while under observation status.    ?  Recent cellulitis around skin tear over left arm  Per daughter's report.  Apparently patient seen by PCP 5 days ago and started on 5-day course of Bactrim.  Per med rec, apparently has 1 to 2 pills left of this.  Not continued on Bactrim here.  No signs of ongoing infection.  -Patient's left forearm has dressing on it, apparently dressed in the ED.  Will place consult to wound RN for further cares.    DVT Prophylaxis: SCD  Code Status:Full code.     Disposition:  Expected discharge whenever safe discharge plan in place following assessment by therapists.    Total time spent in patient encounter: 35 minutes.  Greater than 50% time spent in patient and family counseling regarding current diagnosis and work-up, plans for therapy evaluation and placement and extensive chart review.    Alexa Levy MD  Text Page (7am - 6pm, M-F)    Interval History   Patient has remained stable overnight.  This morning he is alert and conversing appropriately.  States that he feels quite well.  Again asks about going home.  Is not really interested in going to rehab/TCU.  Is fixated on going home as \"he has things to do\".  Denies any significant pain anywhere, except occasional pain in his upper right back with position changes.    -Data reviewed today: I reviewed all new labs and imaging results over the last 24 hours. I personally reviewed EKG with result as noted above    Physical Exam   Temp: 95.7  F (35.4  C) Temp src: Oral BP: (!) 168/76 Pulse: 61   Resp: 22 SpO2: 96 % O2 Device: None (Room air) Oxygen Delivery: 2 LPM  Vitals:    04/30/21 1354   Weight: 65.3 kg (144 lb)     Vital Signs with Ranges  Temp:  [95.7  F (35.4  C)-96.9  F (36.1  C)] 95.7  F (35.4  C)  Pulse:  [61-85] 61  Resp:  [16-22] 22  BP: (132-168)/(36-80) 168/76  SpO2:  [89 %-96 %] 96 %  I/O last 3 completed shifts:  In: 640 " [P.O.:640]  Out: 150 [Urine:150]    Constitutional: Alert, appears comfortable, in no acute distress, appears frail  Respiratory: Non labored breathing, clear to auscultation bilaterally, no crackles or wheezes  Cardiovascular: Heart sounds regular rate and rhythm, no murmurs, bilateral 1+ leg edema  GI: Abdomen is soft, non distended, non tender. Normal BS  Skin/Integumen: Scattered bruising and skin tears, left proximal forearm is in dressing.  Neuro: alert, converses appropriately, moving all extremities, fluent speech, no facial asymmetry  Psych: mood and affect appropriate , alert and oriented x3    Medications       folic acid  1 mg Oral Daily     gabapentin  100 mg Oral TID       Data   Recent Labs   Lab 05/02/21  0640 05/01/21  0612 04/30/21  2134 04/30/21  1311   WBC  --   --   --  10.0   HGB  --  10.4*  --  12.1*   MCV  --   --   --  99   PLT  --   --   --  219    138  --  135   POTASSIUM 4.2 4.5  --  4.5   CHLORIDE 105 108  --  104   CO2 24 24  --  20   BUN 24 32*  --  30   CR 1.23 1.59*  --  1.68*   ANIONGAP 5 6  --  11   SUDHEER 8.8 8.6  --  9.0   * 103*  --  122*   ALBUMIN  --  3.1*  --   --    PROTTOTAL  --  5.8*  --   --    BILITOTAL  --  1.5*  --   --    ALKPHOS  --  53  --   --    ALT  --  40  --   --    AST  --  45  --   --    TROPI  --   --  0.032  --        Imaging  No results found for this or any previous visit (from the past 24 hour(s)).

## 2021-05-02 NOTE — PROGRESS NOTES
Observation goals PRIOR TO DISCHARGE    Comments:   -diagnostic tests and consults completed and resulted: Partially met     -vital signs normal or at patient baseline : Met     -tolerating oral intake to maintain hydration : Met     Nurse to notify provider when observation goals have been met and patient is ready for discharge.

## 2021-05-02 NOTE — PLAN OF CARE
Pt is A&Ox4, [place], VSS on RA expt for HTN, Lisinopril given, Reg diet, IV SL Tele Controlled A-fib, Denies pain, Incontinent of B&BL, Assist of 1 w/ GB and walker, SW and Psych consult pending, L-arm dressing changed, continue to monitor

## 2021-05-02 NOTE — CONSULTS
Care Management Initial Consult    General Information  Assessment completed with: Patient,    Type of CM/SW Visit: Initial Assessment    Primary Care Provider verified and updated as needed:     Readmission within the last 30 days:        Reason for Consult: discharge planning  Advance Care Planning:            Communication Assessment  Patient's communication style: spoken language (English or Bilingual)    Hearing Difficulty or Deaf: yes   Wear Glasses or Blind: yes    Cognitive  Cognitive/Neuro/Behavioral: WDL                      Living Environment:   People in home: alone     Current living Arrangements: house      Able to return to prior arrangements: yes       Family/Social Support:  Care provided by: self  Provides care for: no one  Marital Status: Single  Children          Description of Support System: Involved, Supportive    Support Assessment: Lacks necessary supervision and assistance    Current Resources:   Patient receiving home care services: No     Community Resources: None  Equipment currently used at home: walker, rolling  Supplies currently used at home:      Employment/Financial:  Employment Status: retired        Financial Concerns: No concerns identified   Referral to Financial Counselor: No       Lifestyle & Psychosocial Needs:        Socioeconomic History     Marital status:      Spouse name: Not on file     Number of children: Not on file     Years of education: Not on file     Highest education level: Not on file     Tobacco Use     Smoking status: Current Some Day Smoker     Types: Cigarettes     Smokeless tobacco: Never Used   Substance and Sexual Activity     Alcohol use: Yes     Comment: 1-2 per day     Drug use: No       Functional Status:  Prior to admission patient needed assistance:              Mental Health Status:  Mental Health Status: No Current Concerns       Chemical Dependency Status:  Chemical Dependency Status: No Current Concerns              Values/Beliefs:  Spiritual, Cultural Beliefs, [employer] Practices, Values that affect care: no               Additional Information:  PT/OT recommend TCU. SW met with pt. Explained TCU. He refuses. States maybe he would go in two weeks after he finishes selling a piece of land and completing his taxes. 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 Pt is very [place] which made engaging in conversation difficult. Per chart review, clinic SW notes on 4/28/21 that pt's daughter has secured him an AL apartment at [facility], but pt refused to move in. Psych to consult Monday. If pt continues to refuse services and discharges home, would make VA report.       GEMINI Iqbal, LGSW  [phone]

## 2021-05-02 NOTE — PROGRESS NOTES
Observation goals PRIOR TO DISCHARGE    Comments:   -diagnostic tests and consults completed and resulted: Not met     -vital signs normal or at patient baseline : Partially Met     -tolerating oral intake to maintain hydration : Met     Nurse to notify provider when observation goals have been met and patient is ready for discharge.

## 2021-05-03 PROCEDURE — G0378 HOSPITAL OBSERVATION PER HR: HCPCS

## 2021-05-03 PROCEDURE — 250N000013 HC RX MED GY IP 250 OP 250 PS 637: Performed by: HOSPITALIST

## 2021-05-03 PROCEDURE — G0463 HOSPITAL OUTPT CLINIC VISIT: HCPCS

## 2021-05-03 PROCEDURE — 99207 PR CDG-CODE CATEGORY CHANGED: CPT | Performed by: HOSPITALIST

## 2021-05-03 PROCEDURE — 99225 PR SUBSEQUENT OBSERVATION CARE,LEVEL II: CPT | Performed by: HOSPITALIST

## 2021-05-03 RX ORDER — GINSENG 100 MG
CAPSULE ORAL EVERY OTHER DAY
Status: DISCONTINUED | OUTPATIENT
Start: 2021-05-04 | End: 2021-05-04 | Stop reason: HOSPADM

## 2021-05-03 RX ORDER — LISINOPRIL 10 MG/1
10 TABLET ORAL DAILY
Status: DISCONTINUED | OUTPATIENT
Start: 2021-05-04 | End: 2021-05-04 | Stop reason: HOSPADM

## 2021-05-03 RX ADMIN — GABAPENTIN 100 MG: 100 CAPSULE ORAL at 13:54

## 2021-05-03 RX ADMIN — LISINOPRIL 5 MG: 5 TABLET ORAL at 07:34

## 2021-05-03 RX ADMIN — GABAPENTIN 100 MG: 100 CAPSULE ORAL at 07:34

## 2021-05-03 RX ADMIN — FOLIC ACID 1 MG: 1 TABLET ORAL at 07:34

## 2021-05-03 NOTE — PLAN OF CARE
Observation Goals:    -diagnostic tests and consults completed and resulted: met      -vital signs normal or at patient baseline: met       -tolerating oral intake to maintain hydration: met      Nurse to notify provider when observation goals have been met and patient is ready for discharge.

## 2021-05-03 NOTE — PROGRESS NOTES
[facility]    Hospitalist Progress Note    Date of Admission:  4/30/2021    Assessment & Plan       Praveen Velasquez is a 89 year old  male with medical history of frequent falls, hypertension and long-term alcohol abuse  presented to the ED with fall.     Frequent falls with unsteady gait from physical deconditioning.  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  Concern for cognitive impairment.  Elevated CK, mild rhabdomyolysis  Patient reports was bending over to pick something up, fell to the ground, was down for about 15 minutes until someone driving by saw him up on the ground and try to help him stand, he was unable to help so they called 911.  Patient unsteady on his feet,  recommend fall in the ED noted.  Patient notes that he has fallen 3 times over the last 1 week.  Denies any presyncopal symptoms.  Denies any head injury or loss of consciousness.  On review of chart patient lives alone at home, history of frequent falls, declined home care in the past, declined transition to assisted living facility.  Did call and speak with his daughter, Deann.  She expresses significant concerns for his heavy alcohol use and concern for him going into DTs.  She is very concerned about him living alone.  Apparently home health was set up to be started next week.  WBC 10.0, creatinine 1.68.  TSH is 0.94.  Glucose 122.  UA with ketones, uric acid crystals present.  Admit to observation unit.  CK mildly elevated at 539 indicating mild  rhabdomyolysis.  Improved with IV fluids.  IV fluids stopped on 5/2.  Folate reduced at 5.2.  Begin folate supplement.  B12 adequate.  PT and OT assessments both concluded need for TCU.  He does demonstrate mild cognitive deficits that warrant further skilled therapy.  Needs 24-hour supervision/assistance for functional mobility, safe participation with ADLs.  Patient initially seems receptive to TCU option but repeatedly states that he needs to go  home and appears to decline TCU.   Psychiatry consult was requested on 5/3 to assess decision-making capacity.  He was assessed by DEC and deemed to be decisional.  Had multiple conversations with patient and his daughter.  Patient is adamant about wanting to return home and declines any TCU options.  Daughter expressed significant concerns with having patient return home and states that he has been uncooperative in the past with accepting home cares.  Social work is aware.  They will file vulnerable adult report.  -Plan is for patient to discharge home tomorrow with home cares.  Appreciate CC assisting with making these arrangements.     Atrial fibrillation-not on anticoagulation.  Chronic diastolic heart failure with mild leg edema.  Patient denies any chest pain or palpitation.  EMS reported atrial fibrillation which patient was unaware of.  In ED EKG atrial fibrillation, incomplete RBBB.  Heart rate 70, QTc 499.  TSH and potassium and magnesium within normal limits.  As needed IV metoprolol ordered.  Telemetry monitoring.  Echocardiogram shows normal LVEF at 60 to 65%, early diastolic dysfunction noted.     Hypertension.    Initially held lisinopril with MILA.  MILA resolved.  Restarted lisinopril at 5 mg daily [half dose].     Acute on chronic kidney injury, improving.  CKD stage III.  Mild rhabdomyolysis  Per ED team discussion with PCP last creatinine 1.4 earlier this month.  Creatinine 1.68.  Unable to view labs done through PCP.  Creatinine has improved with IV fluids.  Resume PTA lisinopril on 5/3.  Avoid nephrotoxic drugs.     Alcohol use disorder  Patient's daughter expressed concerns with patient's significant alcohol use history.  States he has drank for the last 70 years.  Apparently he drinks about 1.5 L of rum every 5 days.  Per daughter, he does not have history of DTs or seizures.  Ethanol level 0.02 at admission.  LFTs okay, mildly elevated bilirubin at 1.5.  Discussed need to cut down on alcohol  use.  Monitor closely for signs of alcohol withdrawal.  Started on 100 mg 3 times daily gabapentin to manage both pain as well as possibly decrease risk of going into alcohol withdrawal.  This will not be continued at discharge.     History of monoclonal gammopathy with anemia and CKD.  Anemia.  Hemoglobin 12.  Denies any bleeding at this time.  Unsure of baseline.  Defer anemia work-up to outpatient.  Hold PTA vitamins while under observation status.    ?  Recent cellulitis around skin tear over left arm  Per daughter's report.  Apparently patient seen by PCP 5 days ago and started on 5-day course of Bactrim.  Per med rec, apparently has 1 to 2 pills left of this.  Not continued on Bactrim here.  No signs of ongoing infection.  -Patient's left forearm has dressing on it, apparently dressed in the ED. consulted wound RN for further cares.    DVT Prophylaxis: SCD  Code Status:Full code.     Disposition:  Expected discharge home tomorrow with home care.    Total time spent in patient encounter: 35 minutes.  Greater than 50% time spent in patient and family counseling regarding current diagnosis and work-up, plans for therapy evaluation and placement and extensive chart review.    Alexa Levy MD  Text Page (7am - 6pm, M-F)    Interval History   Patient has remained stable overnight.  This morning he is alert and conversing appropriately.  He is very hard of hearing that makes it difficult to communicate with him.  Remains adamant about wanting to discharge home and not going to TCU.  Admits to some pain in his upper back with position change.  Tells me he feels steady on his feet and is able to ambulate with walker.  Denies shortness of breath.    -Data reviewed today: I reviewed all new labs and imaging results over the last 24 hours. I personally reviewed EKG with result as noted above    Physical Exam   Temp: 98.2  F (36.8  C) Temp src: Oral BP: (!) 163/67 Pulse: 79   Resp: 17 SpO2: 95 % O2 Device: None (Room  air)    Vitals:    04/30/21 1354   Weight: 65.3 kg (144 lb)     Vital Signs with Ranges  Temp:  [96.2  F (35.7  C)-98.3  F (36.8  C)] 98.2  F (36.8  C)  Pulse:  [64-83] 79  Resp:  [17-21] 17  BP: (157-180)/(50-86) 163/67  SpO2:  [92 %-95 %] 95 %  I/O last 3 completed shifts:  In: 735 [P.O.:735]  Out: 50 [Urine:50]    Constitutional: Alert, appears comfortable, in no acute distress, appears frail  Respiratory: Non labored breathing, clear to auscultation bilaterally, no crackles or wheezes  Cardiovascular: Heart sounds regular rate and rhythm, no murmurs, bilateral 1+ leg edema  GI: Abdomen is soft, non distended, non tender. Normal BS  Skin/Integumen: Scattered bruising and skin tears, left proximal forearm is in dressing.  Neuro: alert, converses appropriately, moving all extremities, fluent speech, no facial asymmetry  Psych: mood and affect appropriate , alert and oriented x3    Medications       folic acid  1 mg Oral Daily     gabapentin  100 mg Oral TID     lisinopril  5 mg Oral Daily       Data   Recent Labs   Lab 05/02/21  0640 05/01/21  0612 04/30/21  2134 04/30/21  1311   WBC  --   --   --  10.0   HGB  --  10.4*  --  12.1*   MCV  --   --   --  99   PLT  --   --   --  219    138  --  135   POTASSIUM 4.2 4.5  --  4.5   CHLORIDE 105 108  --  104   CO2 24 24  --  20   BUN 24 32*  --  30   CR 1.23 1.59*  --  1.68*   ANIONGAP 5 6  --  11   SUDHEER 8.8 8.6  --  9.0   * 103*  --  122*   ALBUMIN  --  3.1*  --   --    PROTTOTAL  --  5.8*  --   --    BILITOTAL  --  1.5*  --   --    ALKPHOS  --  53  --   --    ALT  --  40  --   --    AST  --  45  --   --    TROPI  --   --  0.032  --        Imaging  No results found for this or any previous visit (from the past 24 hour(s)).

## 2021-05-03 NOTE — PLAN OF CARE
Observation Goals:    -diagnostic tests and consults completed and resulted: not met, PT/OT/Psych consult today  -vital signs normal or at patient baseline: met   -tolerating oral intake to maintain hydration: met  Nurse to notify provider when observation goals have been met and patient is ready for discharge.

## 2021-05-03 NOTE — PLAN OF CARE
Observation Goals:    -diagnostic tests and consults completed and resulted: not met, PT/OT/Psych consult today  -vital signs normal or at patient baseline: met   -tolerating oral intake to maintain hydration: met  Nurse to notify provider when observation goals have been met and patient is ready for discharge.        A/Ox4. VSS. [place]. Ax1 w/GB and walker. Denies pain, HA, dizziness, or blurred vision. Tele is sinus dysrhythmia. Skin tear covered w/ gauze on right arm, CDI. Plan for Pt/OT/Psych consults today.

## 2021-05-03 NOTE — PLAN OF CARE
Observation Goals:    -diagnostic tests and consults completed and resulted: not met, PT/OT/Psych consult tomorrow  -vital signs normal or at patient baseline: met   -tolerating oral intake to maintain hydration: met  Nurse to notify provider when observation goals have been met and patient is ready for discharge.

## 2021-05-03 NOTE — PLAN OF CARE
Assumed care at 0730. Pt is a/o X4. [place]. Pt left hearing aide at home. VSS. BP slightly elevated. Tolerating reg diet. Up SBA with gb and walker. Up in chair for meals. L forearm skin tear present. New dsg applied. Woc consult pending. DEC assessment completed. Pt and family updated wit POC. Plan for discharge home tomorrow.

## 2021-05-03 NOTE — PROGRESS NOTES
MD Notification    Notified Person: MD    Notified Person Name: dr Levy     Notification Date/Time: 5/3/2021  1233       Notification Interaction: via text    Purpose of Notification: DEC deemed pt competent. Pt is open to home cares    Orders Received:    Comments: SW and pt aware of POC. CC to help arrange home cares

## 2021-05-03 NOTE — PROGRESS NOTES
"Care Management Follow Up    Length of Stay (days): 0    Expected Discharge Date: 05/04/21([facility])     Concerns to be Addressed: discharge planning     Patient plan of care discussed at interdisciplinary rounds: Yes    Anticipated Discharge Disposition: home. Patient declines TCU     Anticipated Discharge Services:  HHRN/HHA/PT/OT  Anticipated Discharge DME:      Patient/family educated on Medicare website which has current facility and service quality ratings:    Education Provided on the Discharge Plan:    Patient/Family in Agreement with the Plan:      Referrals Placed by CM/SW:  [facility]  Private pay costs discussed: Not applicable    Additional Information:  Spoke with patient, Hospitalist also at bedside. Patient declines TCU at this time. \" I just want 2 weeks without anyone telling me what is best for me\". Became more adamant about returning home as the conversation continued  He is agreeable to Home Care- would benefit from RN/PT/OT/HHA/SW  Referral sent to [facility] (in Network for his insurance) [phone]  Upon discharge orders to be FAXED to [phone]     aware- she will file VA report if he discharges home    Writer called daughter Deann to discuss- lengthy conversation. She and her brother are frustrated with his refusal for TCU. She agrees to be the contact for [facility] her contact is: [phone].   She doubts that patient will  answer the phone when they call  She will NOT provide transportation home for him- either ride will need to be set up or cab          "

## 2021-05-03 NOTE — CONSULTS
[facility] . Please refer to their note for details.

## 2021-05-03 NOTE — PROGRESS NOTES
4/30/2021  Praveen Velasquez 12/10/1930     [facility] Mental Health Assessment:    Started at: 10:40 AM    Completed at: 11:10 AM    Patient was assessed in-person.    Chief Complaint and History of Presenting Problem  Patient is a 89 year old  male who presented to the ED by Medics related to concerns for a fall outside in front of his home.     History of presenting problem and relevant collateral information: Records indicate that pt was brought to the ED after a fall in his front yard and admitted to the observation unit after experiencing two more falls while in the ED.  Transfer to TCU has been recommended by PT/OT and declined by pt.  Psych consult was requested to determine pt's capacity to make his own decisions.     Psychotherapy techniques or interventions utilized throughout assessment include: Establishing rapport, Active listening, Apply solution-focused therapy to address current crisis, Brief Supportive Therapy and Other: Competency assessment    Demographics, Social, and Environmental Conditions   Patient lives alone in his own home and is their own legal guardian. . Patient is not currently employed. Patient receives additional income from Other: social security. Patient is not currently a student. Patient is not a  member or . Patient identifies significant interpersonal relationships which include his daughter.     Legal History  Patient has a history of or current involvement with the legal system (I.e probation, parole, arrests, incarceration): No. There is not a history of civil commitment.      Spiritual and Cultural Influences  Patient reports important Confucianism or spiritual beliefs which include Restorationism. Patient reports important cultural influences which include n/a.     Mental Health History and Current Symptoms   Patient identifies historical diagnoses of none. At baseline, patient describes their mental health symptoms as n/a.  Pt denies MH history, reports that he's  "frustrated that his body doesn't always work the way he wants it to, but understands that this is to be expected at age 89.     Current Providers  Primary Care Provider: Yes  Psychiatrist: No  Therapist: No  : No  ARMHS: No  ACT Team: No  Other: No    History of psychiatric hospitalizations? No  History of programmatic care? No  Current psychotropic medications? No  Medication Compliant? Yes  Recent medication changes? n/a    Family Health History   Patient's family medical history includes n/a. Patient's family mental health history includes n/a.     Development and Physical Health Challenges  Delays or concerns meeting developmental milestones? No  History of concussion or TBI? No  Patient identifies concerns with completing ADLs? Pt reports that he is aware of the need to be very careful when attending to ADLs and tasks around his home.  He states that he knows his legs are weaker and his balance isn't quite as good.  Patient can ambulate independently? Yes and uses a wheeled walker  Other medical health concerns? No    Abuse, Maltreatment, and Trauma History  Physical abuse: No  Emotional/psychological abuse: No  Sexual abuse: No  Loss of a friend or family member to suicide: No  Other identified traumatic event or significant stressor: No    Current Symptoms  Attention, Hyperactivity, and Impulsivity: No   Anxiety:No    Behavioral Difficulties: No   Mood Symptoms: No   Appetite: No   Feeding and Eating: No  Interpersonal Functioning: No  Learning Disabilities/Cognitive/Developmental Disorders: No   General Cognitive Impairments: No  If yes, see completed Mini-Cog Assessment below.  Sleep: No   Psychosis: No    Trauma: No     Substance Use  Patient does  have a history of substance use which includes Substance #1: Name(s): Alcohol (Rum) Frequency: daily Quantity: reports \"a couple\" of drinks most evenings. Duration: \"most of my life\"  Daughter reports pt has been drinking for the past 70years, " believes he drinks about 1.5L Rum every 5 days. Last use: 4 days ago, prior to hospitalization. Method: drinks.. Patient has not participated in chemical dependency treatment or detox.      Patient has recently completed a drug screen or BAL/Breathalyzer? Yes.      History of Suicidal Ideation, Suicide Attempts, and Risk Formulation   Patient does not have a history of suicidal ideation. Patient does not acknowledge a history of suicide attempts. Previous attempts include none. Patient does not have a history of self-injurious behavior. Patient identifies self-injury via the following methods: none.     ESS-6  1.a. Over the past 2 weeks, have you had thoughts of killing yourself? No   1.b. Have you ever attempted to kill yourself and, if yes, when did this last happen? No  2. Recent or current suicide plan? No  3. Recent or current intent to act on ideation? No  4. Lifetime psychiatric hospitalization? No  5. Pattern of excessive substance use? No  6. Current irritability, agitation, or aggression? No  ESS-6 Score: 0/6      Current risk factors for suicide include age. Protective factors against suicide include strong bond to family/friends, community support, responsibilities to others (spouse, pets, children, etc.), engaged and/or invested in treatment, culture, spiritual, or Holiness beliefs, identification of future goals, future oriented towards goals, hopes, dreams, reality testing ability and sense of belonging.    Other Risk Areas  Aggressive/assumptive/homicidal risk factors: No   Duty to warn?No   Was a Child Protection Report Made? No   Was a Adult Protection Report Made? No      Sexually inappropriate behavior? No   Vulnerability to sexual exploitation? No     Mental Status Exam  Affect: Appropriate  Appearance: Appropriate   Attention Span/Concentration: Attentive    Eye Contact: Engaged  Fund of Knowledge: Appropriate   Language /Speech Content: Fluent  Language /Speech Volume: Normal   Language  /Speech Rate/Productions: Normal   Recent Memory: Intact  Remote Memory: Intact  Mood: Normal   Psychomotor Behavior: Normal   Thought Content: Clear  Thought Form: Intact  Orientation:   Person: Yes   Place: Yes  Time of Day: Yes   Date: Yes   Situation (Do they understand why they are here?): Yes     Assessments and Screeners  SCOFF  Do you make yourself sick (induce vomiting) because you feel uncomfortably full? No   Do you worry that you have lost Control over how much you eat? No  Have you recently lost more than 14 lb in a three-month period? No   Do you think you are too fat, even though others say you are too thin? No   Would you say that food dominates your life? No  SCOFF Score: 0    Mini-Cog Assessment  Instructions:  1. Ask the patient to listen carefully and remember three unrelated words (ex. Table, apple, pavithra).  2. Ask the patient to draw a clock: first the [place], then the numbers, then the hands at a specific time (ex. 11:10am).  3. Ask the patient to repeat the three words.    Number of Words Recalled: 3  Clock-Drawing Test: 2 (Normal)   Mini-Cog Total Score: 5  Details: Pt joked about how simple the test appeared and did quite well!    CAGE-AID  Have you felt you ought to cut down on your drinking or drug use? No     Have people annoyed you by criticizing your drinking or drug use? Yes   Have you felt bad or guilty about your drinking or drug use? No  Have you ever had a drink or used drugs first thing in the morning to steady your nerves or to get rid of a hangover? No   CAGE-AID Score: 1/4   Pt reported that his daughter thinks he drinks too much, but he has been having his drinks in the evening for most of his life and this has never been problematic for him.    GAIN Short Screener (GAIN-SS) administered? NA    Aid to Competency Assessment (ACE)     Able to understand medical problem: Yes.      Observations: Pt was able to describe in detail what led to his fall outside his home, how his  "neighbor tried to help him up, and paramedics coming to get him.  Was also able to describe the 2 falls in the ED and that after the 2nd fall, how he changed his mind about returning home and requested to be admitted to the hospital.      Able to understand proposed treatment: Yes.    Observations: Pt explained some of the recommendations by OT/PT and understood that transfer to TCU to continue exercises, has been recommended.      Able to understand alternative to proposed treatment: Yes    Observations: Pt appeared to understand that home care services, including PT and OT was recommended as an alternative, but that transfer to TCU was preferred treatment.        Able to understand option of refusing proposed treatment? Yes.    Observations: Pt laughed and said that he knew that he could fall at home and lay there for days and end up \"in worse shape than I am now!\".      Able to appreciate reasonably foreseeable consequences of accepting proposed treatment? Yes    Observations:  Pt appears to understand and explain that if he were to go to a TCU, he might improve more quickly and there would be people around should he fall again.  However he believes that he will be more active at home and that going to a TCU will mean that he's in bed more and this will actually make him weaker.         Able to appreciate reasonably foreseeable consequences of refusing proposed treatment? Yes.     Observations: Able to explain that he understands the main concern is that he'll fall again when at home alone.      The person s decision is affected by depression. No    Observations: Pt denies symptoms of depression current or recent.  He does not appear to be experiencing any symptoms of depression.       The person s decision is affected by delusion/psychosis.No    Observations: Pt's thinking does not appear to be delusional in nature.    Clinical Summary and Disposition  Clinical summary:   Patient's strengths, protective factors, " "and community resources include Dtr Deann, who is very involved in his care. Patient's coping skills include attending to \"things on my list\", reaching out to dtr for help with errands, delivery of groceries, other needs.  Listening to music, watching t.v., reading. Areas of vulnerability for this patient are: his sense of independence and perhaps a belief that he is stronger and more agile than he actually is.  Provider's current assessment of risk includes: concern around pt's own assessment of his physical strength, agility and balance.   However he is able to report recommendations from PT/OT accurately and describe interactions with PT and OT here in the hospital.  He is able to tell me the things that he needs to be \"super careful\" about at home, such as leaning over or getting up out of a chair or a kneeling position too quickly.  And appears to be aware of his limitations and need for continuing exercises with assistance from PT.    Diagnosis:  F43.22 Adjustment Disorder; with anxiety.    Recommendation:  Attending provider, Dr Alexandro Courtney consulted and does  agree with recommended disposition which includes Other: Home Care PT/OT.  Also willing to consider medical alert button for home. Patient agrees with recommended level of care.  Yes     Details of final disposition include: Other: Discussed plan for home care and pt's capacity to make this decision, with pt's RN and unit .   will speak with care coordinator for home care referral and will try to contact pt's dtr to give resources for setting up a medical alert button.    If Inpatient, is patient admitted voluntary? N/A   Patient aware of potential for transfer if there is not appropriate placement? n/a   Patient is willing to travel outside of the [facility] for placement? n/a   Central Intake Notified? NA    Safety and After Care Planning       Safety Plan Provided? No    Follow-Up Plans and Providers: Pt will follow up with " providers as recommended by hospital MD.  Understands that the unit care coordinator will speak with him about home care services.    Follow-Up Plan:  After care plan provided to the patient/guardian by: Irma Crenshaw LICSW    After care plan provided to any additional sources/parties? No    Duration of face to face time with patient in minutes: .50 hrs    CPT code(s) utilized: 93238 - Psychotherapy for Crisis - 60 (30-74*) min    KYARA Bryson

## 2021-05-04 VITALS
WEIGHT: 144 LBS | HEIGHT: 71 IN | BODY MASS INDEX: 20.16 KG/M2 | SYSTOLIC BLOOD PRESSURE: 118 MMHG | DIASTOLIC BLOOD PRESSURE: 52 MMHG | TEMPERATURE: 96.4 F | OXYGEN SATURATION: 96 % | RESPIRATION RATE: 20 BRPM | HEART RATE: 55 BPM

## 2021-05-04 PROCEDURE — 250N000013 HC RX MED GY IP 250 OP 250 PS 637: Performed by: HOSPITALIST

## 2021-05-04 PROCEDURE — 99217 PR OBSERVATION CARE DISCHARGE: CPT | Performed by: HOSPITALIST

## 2021-05-04 PROCEDURE — G0378 HOSPITAL OBSERVATION PER HR: HCPCS

## 2021-05-04 RX ORDER — GINSENG 100 MG
CAPSULE ORAL EVERY OTHER DAY
Qty: 14 G | Refills: 0 | Status: SHIPPED | OUTPATIENT
Start: 2021-05-04

## 2021-05-04 RX ORDER — FOLIC ACID 1 MG/1
1 TABLET ORAL DAILY
Qty: 30 TABLET | Refills: 0 | Status: SHIPPED | OUTPATIENT
Start: 2021-05-05

## 2021-05-04 RX ORDER — ACETAMINOPHEN 325 MG/1
650 TABLET ORAL EVERY 4 HOURS PRN
COMMUNITY
Start: 2021-05-04

## 2021-05-04 RX ADMIN — FOLIC ACID 1 MG: 1 TABLET ORAL at 07:48

## 2021-05-04 RX ADMIN — LISINOPRIL 10 MG: 10 TABLET ORAL at 07:48

## 2021-05-04 NOTE — PROGRESS NOTES
[facility]  WO Nurse Inpatient Wound Assessment     Reason for consultation: Evaluate and treat  LFA skin tear     Assessment  LFA skin tear:  Trauma related from fall .  Flap over wound bed intact, small amt sero-sang drainage,  No local s/s infection    Treatment Plan  Wound care: LFA skin tears  -change dressing on odd days  -Clean with MicroKlenz and pat dry  -Bacitracin to wound bed  -cover with Mepilex border or Optifoam gentle    Orders In Epic  Recommended provider order:NA  WOC Nurse follow-up plan: weekly  Nursing to notify the Provider(s) and re-consult the WO Nurse if wound(s) deteriorates or new skin concern.    Patient History  According to provider note(s):    Praveen Velasquez is a 89 year old  male with medical history of frequent falls, hypertension and long-term alcohol abuse  presented to the ED with fall  Objective Data  Containment of urine/stool: Incontinence protocol prn    Active Diet Order:  Orders Placed This Encounter      Regular Diet Adult    Output:   I/O last 3 completed shifts:  In: 600 [P.O.:600]  Out: 50 [Urine:50]    Risk Assessment:   Sensory Perception: 4-->no impairment  Moisture: 4-->rarely moist  Activity: 3-->walks occasionally  Mobility: 3-->slightly limited  Nutrition: 3-->adequate  Friction and Shear: 3-->no apparent problem  Octavio Score: 20                          Labs:   Recent Labs   Lab 05/01/21  0612 04/30/21  1311   ALBUMIN 3.1*  --    HGB 10.4* 12.1*   WBC  --  10.0       Physical Exam  Wound Location:  LFA skin tear  Date of last photo: 5-3-21  Measurements (length x width x depth, in cm):  3.0 x 1.5cm  x superficial  Wound Base: skin flap over wound bed  Palpation of the wound bed:firm  Periwound skin: generalized ecchymosis  Temperature: warm  Drainage: scant bloody  Description of drainage  Odor: none  Pain:denies    Interventions  Current support surface: atmos air  Visual inspection of wound(s) completed  Wound Care:  completed  Supplies: Pt room and floor supply room  Education provided: discussed POC with patient      Gabbi Schmitz RN, CWOCN

## 2021-05-04 NOTE — PLAN OF CARE
Pt doing well. A/o. VSS. No pain. No SOB. [place]. Up in chair for meals. Ambulated in the hallway. Up SBA with walker. Discharge paper work reviewed with pt. Verbalizes understanding. PIV removed. Take home  meds given. Verified 5 rights of med. All questions answered. Pt son will be here to take pt home.     Addendum 1212: son at bedside. Discharge paper work reinforced with pt and son. All questions answered. Home with son.

## 2021-05-04 NOTE — PROGRESS NOTES
Care Management Follow Up    Length of Stay (days): 0    Expected Discharge Date: 05/04/21([facility])     Concerns to be Addressed: discharge planning     Patient plan of care discussed at interdisciplinary rounds: Yes    Anticipated Discharge Disposition: Home     Anticipated Discharge Services:  HHRN/PT/OT/SW/HHA  Anticipated Discharge DME: malini     Patient/family educated on Medicare website which has current facility and service quality ratings:    Education Provided on the Discharge Plan:  yes  Patient/Family in Agreement with the Plan:  no. Daughter aware of plan for [facility]. See discharge summary    Referrals Placed by CM/SW:  [facility]  Private pay costs discussed: Not applicable    Additional Information:  Discharge orders faxed to [facility] [phone]  Daughter Deann agrees to be contact for [facility] as patient is [place] and not likely to answer phone. This was indicated on the facesheet that was faxed to [facility]  Discussed life alert button yesterday- patient given written information and senior linkage line booklet

## 2021-05-04 NOTE — PLAN OF CARE
Pt [place], no hearing aids.Worse on right ear. A&Ox4, VSS except elevated BP's and bradycardic. Saturations on RA. Denies chest pain and SOB. Skin tear on Left forearm/elbow, dressing in place cdi. WOC consulted, orders in place. Tele: SB with dysrhythmia. Pt declines TCU, plan to discharge home with home care. Transportation needs to be set up

## 2021-05-04 NOTE — PROGRESS NOTES
Observation goals PRIOR TO DISCHARGE    Comments:     -diagnostic tests and consults completed and resulted : Met    -vital signs normal or at patient baseline : partially met, elevated BP's    -tolerating oral intake to maintain hydration : Met    Nurse to notify provider when observation goals have been met and patient is ready for discharge.

## 2021-05-04 NOTE — PROGRESS NOTES
Care Management Follow Up    Length of Stay (days): 0    Expected Discharge Date: 05/04/21([facility])     Concerns to be Addressed: discharge planning     Patient plan of care discussed at interdisciplinary rounds: Yes    Anticipated Discharge Disposition: Home w/ HC     Anticipated Discharge Services:  In-home HC  Anticipated Discharge DME:      Patient/family educated on Medicare website which has current facility and service quality ratings:    Education Provided on the Discharge Plan:  yes  Patient/Family in Agreement with the Plan:  Patient in agreement, family NOT in agreement.    Referrals Placed by CM/SW:    Private pay costs discussed: Not applicable    Additional Information:  SW spoke to patient about discharge recommendations, asking patient to consider going to rehab for even a few days. Patient considered for a few seconds although ultimately stated, 'I would rather go home first for a little bit to get some things done before going to rehab.' When asked how patient would get home he stated, 'I think my son Matt can pick me up after work today around 5pm.' Pt provided son Matt's number, [phone]. SW called and left  for Matt and awaiting return call.    SW to file Vulnerable Adult report and notified patient of this act due to professional licensure mandate.    SW to continue to follow and assist with discharge planning.    LAURA Herbert  Daytime (8:00am-4:30pm): [phone]  After-Hours  Pager (4:30pm-11:30pm): [phone]           LAURA Ledezma

## 2021-05-04 NOTE — PROGRESS NOTES
Observation goals PRIOR TO DISCHARGE    Comments:     -diagnostic tests and consults completed and resulted : Met    -vital signs normal or at patient baseline : partially met, elevated BP's    -tolerating oral intake to maintain hydration : Met    Nurse to notify provider when observation goals have been met and patient is ready for discharge.         [place] AMBULATORY ENCOUNTER(APSO):    [facility]  [address] [facility] [address]  [phone]    ASSESSMENT & PLAN:  1. Acute midline low back pain without sciatica      Return in about 6 months (around 10/5/2021).  Hypertension increase Valsartan to 160 mg daily and set up nurse BP check.  See me six months.  Back pain resolved.      The patient indicates understanding of these issues and agrees with the plan.       CHIEF COMPLAINT:   Chief Complaint   Patient presents with   • Follow-up     follow up back pain            HISTORY OF PRESENT ILLNESS:    He is complaining of/requesting evaluation for follow up back pain it has resolved.  Knee is also fine.  Crazy busy at work landscaping.      No new concerns.  He is taking Valsartan 80 mg po in evening.  No side effects.      PAST HISTORIES:  I have reviewed the past medical history,  social history, medications and allergies listed in the medical record as obtained by my nursing staff and support staff and agree with their documentation.  ALLERGIES:   Allergen Reactions   • Bee Sting      Current Outpatient Medications   Medication Sig Dispense Refill   • aspirin 325 MG tablet Take 325 mg by mouth 3 times daily.     • valsartan (DIOVAN) 80 MG tablet Take 1 tablet by mouth daily. 30 tablet 11   • calcium carbonate (TUMS) 500 MG chewable tablet Chew 1 tablet by mouth daily.     • cyclobenzaprine (FLEXERIL) 10 MG tablet Take 1 tablet by mouth 2 times daily as needed for Muscle spasms. 14 tablet 0   • ibuprofen (MOTRIN) 600 MG tablet Take 1 tablet by mouth every 12 hours as needed for Pain. 7 tablet 0   • aspirin-acetaminophen-caffeine (EXCEDRIN MIGRAINE) 250-250-65 MG per tablet Take 1 tablet by mouth every 6 hours as needed for Pain.     • EPINEPHrine 0.3 MG/0.3ML SOAJ Inject 0.3 mg as directed as needed (bee sting). Epi pen inject into muscle as directed, as needed for Bee sting 2 each 2     No  current facility-administered medications for this visit.     Past Medical History:   Diagnosis Date   • Bee sting allergy    • Epistaxis    • GERD (gastroesophageal reflux disease)    • Hx of seborrheic dermatitis    • Overweight(278.02)      Past Surgical History:   Procedure Laterality Date   • Tonsillectomy and adenoidectomy     • Vale tooth extraction         REVIEW OF SYSTEMS:  Except as stated above, the patient denies:  Headaches, diplopia, field cut, or swallowing difficulty. No chest pain, chest  pressure, shortness of breath, PND, orthopnea or hemoptysis. No nausea,  vomiting, diarrhea, constipation, melena, hematochezia or hematemesis. No  change in urination. No dysuria, frequency, hematuria, or urgency. No  joint swelling. No skin rash.     PHYSICAL EXAM:    Vitals:    04/05/21 1449 04/05/21 1456   BP: (!) 128/98 (!) 142/98   BP Location: RUE - Right upper extremity RUE - Right upper extremity   Patient Position: Sitting Sitting   Cuff Size: Large Adult Large Adult   Pulse: 82    Temp: 97 °F (36.1 °C)    TempSrc: Temporal    SpO2: 97%    Weight: (!) 153.5 kg      GENERAL:  Pleasant, Cooperative in no distress.  Color is good.  HEENT: Head normocephalic, atraumatic. Eyes: Extraocular movements  intact. Pupils are equally responsive and reactive to light. Conjunctivae  pink. Sclerae anicteric. Ears: External ears are normal. Tympanic  membranes and external auditory canals are normal. Nasopharynx and Oropharynx masked and not examined due to COVID-19 Pandemic.  NECK: Supple without mass. No thyromegaly or adenopathy.   LUNGS: Lung fields are clear to auscultation and percussion.  HEART: Regular rate and rhythm. Normal S1 and S2. No murmur, rub,  gallop, or click.   ABDOMEN: Soft and nontender. No mass. No hepatosplenomegaly. No flank percussion tenderness.  LOWER EXTREMITIES: No edema. Dorsalis pedis and posterior tibial pulses  are normal.  LYMPH: No axillary, supraclavicular, or inguinal  adenopathy.        LAB RESULTS:  No visits with results within 1 Month(s) from this visit.   Latest known visit with results is:   Lab Services on 02/14/2021   Component Date Value Ref Range Status   • SARS-CoV-2 by PCR 02/14/2021 Not Detected  Not Detected / Detected / Inhibitor Present Final   • Isolation Guidelines 02/14/2021    Final                    Value:This result contains rich text formatting which cannot be displayed here.   • Procedural Notes 02/14/2021    Final                    Value:This result contains rich text formatting which cannot be displayed here.

## 2021-05-04 NOTE — PLAN OF CARE
A/Ox4. Solomon worse on right side. VSS on room air w/ elevated BP. Regular diet. SBA with walker. Skin tear to left forearm/elbow. Dressing clean, dry, intact. WOC consulted. Patient declines TCU, plan to discharge home.

## 2021-05-04 NOTE — PLAN OF CARE
Physical Therapy Discharge Summary    Reason for therapy discharge:    Discharged to home with home therapy.    Progress towards therapy goal(s). See goals on Care Plan in [facility] electronic health record for goal details.  Goals not met.  Barriers to achieving goals:   discharge from facility.    Therapy recommendation(s):    Continued therapy is recommended.  Rationale/Recommendations:  TCU was recommended, although pt refused and is going home with home therapy.

## 2021-05-04 NOTE — PROGRESS NOTES
V.A report filed, Web Report number: [id number] and submitted Tue May 04 2021 16:46:19.    LAURA Herbert  Daytime (8:00am-4:30pm): [phone]  After-Hours  Pager (4:30pm-11:30pm): [phone]

## 2021-05-04 NOTE — DISCHARGE SUMMARY
Discharge Summary  Hospitalist    Date of Admission:  4/30/2021  Date of Discharge:  5/4/2021  Discharging Provider: Alexa Levy MD  Date of Service (when I saw the patient): 05/04/21    Discharge Diagnoses   Frequent falls with unsteady gait from physical deconditioning.  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  Concern for cognitive impairment.  Elevated CK, mild rhabdomyolysis    History of Present Illness   Please refer H & P for details.      Hospital Course   Praveen Velasquez is a 89 year old  male with medical history of frequent falls, hypertension and long-term alcohol abuse  presented to the ED with fall.     Frequent falls with unsteady gait from physical deconditioning.  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  Concern for cognitive impairment.  Elevated CK, mild rhabdomyolysis  Patient reports was bending over to pick something up, fell to the ground, was down for about 15 minutes until someone driving by saw him up on the ground and try to help him stand, he was unable to help so they called 911.  Patient unsteady on his feet,  recommend fall in the ED noted.  Patient notes that he has fallen 3 times over the last 1 week.  Denies any presyncopal symptoms.  Denies any head injury or loss of consciousness.  On review of chart patient lives alone at home, history of frequent falls, declined home care in the past, declined transition to assisted living facility.  Did call and speak with his daughter, Deann.  She expresses significant concerns for his heavy alcohol use and concern for him going into DTs.  She is very concerned about him living alone.  Apparently home health was set up to be started in the near future.  WBC 10.0, creatinine 1.68.  TSH is 0.94.  Glucose 122.  UA with ketones, uric acid crystals present.  Admit to observation unit.  CK mildly elevated at 539 indicating mild  rhabdomyolysis.  Improved with IV fluids.  IV fluids stopped on 5/2.  Folate  reduced at 5.2.  Begin folate supplement.  B12 adequate.  PT and OT assessments both concluded need for TCU.  He does demonstrate mild cognitive deficits that warrant further skilled therapy.  Needs 24-hour supervision/assistance for functional mobility, safe participation with ADLs.  Patient initially seems receptive to TCU option but repeatedly states that he needs to go home and appears to decline TCU.   Psychiatry consult was requested on 5/3 to assess decision-making capacity.  He was assessed by DEC and deemed to be decisional.  Had multiple conversations with patient and his daughter.  Patient is adamant about wanting to return home and declines any TCU options.  Significant risks with this plan were discussed with the patient and he indicates full understanding but remains clear on wanting to go home. Daughter expressed significant concerns with having patient return home and states that he has been uncooperative in the past with accepting home cares.  Social work is aware.  They will file vulnerable adult report.  -Plan is for patient to discharge home with home cares.  Appreciate CC assisting with making these arrangements.     Atrial fibrillation-not on anticoagulation.  Chronic diastolic heart failure with mild leg edema.  Patient denies any chest pain or palpitation.  EMS reported atrial fibrillation which patient was unaware of.  In ED EKG atrial fibrillation, incomplete RBBB.  Heart rate 70, QTc 499.  TSH and potassium and magnesium within normal limits.  As needed IV metoprolol ordered.  Telemetry monitoring.  Echocardiogram shows normal LVEF at 60 to 65%, early diastolic dysfunction noted.     Hypertension.    Initially held lisinopril with MILA.  MILA resolved.  Restarted lisinopril after 2 days.     Acute on chronic kidney injury, improving.  CKD stage III.  Mild rhabdomyolysis  Per ED team discussion with PCP last creatinine 1.4 earlier this month.  Creatinine 1.68.  Unable to view labs done  through PCP.  Creatinine has improved with IV fluids.  Resume PTA lisinopril on 5/3.  Avoid nephrotoxic drugs.     Alcohol use disorder  Patient's daughter expressed concerns with patient's significant alcohol use history.  States he has drank for the last 70 years.  Apparently he drinks about 1.5 L of rum every 5 days.  Per daughter, he does not have history of DTs or seizures.  Ethanol level 0.02 at admission.  LFTs okay, mildly elevated bilirubin at 1.5.  Discussed need to cut down on alcohol use.  Monitor closely for signs of alcohol withdrawal.  Started on 100 mg 3 times daily gabapentin to manage both pain as well as possibly decrease risk of going into alcohol withdrawal.  This is not continued at discharge.     History of monoclonal gammopathy with anemia and CKD.  Anemia.  Hemoglobin 12, went down to 10.4 with hydration.  Denies any bleeding at this time.  Unsure of baseline.  Hold PTA vitamins while under observation status.  Started on folate as noted above.     ?  Recent cellulitis around skin tear over left arm  Per daughter's report.  Apparently patient seen by PCP 5 days ago and started on 5-day course of Bactrim.  Per med rec, apparently has 1 to 2 pills left of this.  Not continued on Bactrim here.  No signs of ongoing infection.  -Patient's left forearm has dressing on it, apparently dressed in the ED. consulted wound RN who has indicated instructions for further wound cares.     Patient is discharged home in stable condition.    Alexa Levy MD, MD      Pending Results   These results will be followed up by Hospitalist team.  Unresulted Labs Ordered in the Past 30 Days of this Admission     No orders found from 3/31/2021 to 5/1/2021.          Code Status   Full Code       Primary Care Physician   Sameer Brand    Follow-ups Needed After Discharge   Follow-up Appointments     Follow-up and recommended labs and tests       Follow up with primary care provider, Sameer Brand, within 7 days for    hospital follow- up.  No follow up labs or test are needed.             Physical Exam   Temp: 96.4  F (35.8  C) Temp src: Oral BP: (!) 165/70 Pulse: 54   Resp: 18 SpO2: 98 % O2 Device: None (Room air)    Vitals:    04/30/21 1354   Weight: 65.3 kg (144 lb)     Vital Signs with Ranges  Temp:  [96.4  F (35.8  C)-98.2  F (36.8  C)] 96.4  F (35.8  C)  Pulse:  [45-82] 54  Resp:  [17-18] 18  BP: (150-174)/(59-75) 165/70  SpO2:  [95 %-98 %] 98 %  I/O last 3 completed shifts:  In: 560 [P.O.:560]  Out: -     Constitutional: Alert, appears comfortable, in no acute distress, appears frail  Respiratory: Non labored breathing, clear to auscultation bilaterally, no crackles or wheezes  Cardiovascular: Heart sounds regular rate and rhythm, no murmurs, bilateral 1+ leg edema  GI: Abdomen is soft, non distended, non tender. Normal BS  Skin/Integumen: Scattered bruising and skin tears, left proximal forearm is in dressing.  Neuro: alert, converses appropriately, moving all extremities, fluent speech, no facial asymmetry  Psych: mood and affect appropriate   , alert and oriented x3         Discharge Disposition   Discharged to home  Condition at discharge: Stable    Consultations This Hospital Stay   CARE MANAGEMENT / SOCIAL WORK IP CONSULT  PHYSICAL THERAPY ADULT IP CONSULT  OCCUPATIONAL THERAPY ADULT IP CONSULT  PSYCHIATRY IP CONSULT  WOUND OSTOMY CONTINENCE NURSE  IP CONSULT    Time Spent on this Encounter   IAlexa MD, personally saw the patient today and spent greater than 30 minutes discharging this patient.    Discharge Orders      Home care nursing referral      Home Care PT Referral for Hospital Discharge      Home Care OT Referral for Hospital Discharge      Wound care and dressings    Instructions to care for your wound at home:  Wound care: Left forearm skin tears  -change dressing on odd days  -Clean with MicroKlenz and pat dry  -Bacitracin to wound bed  -cover with Mepilex border or Optifoam gentle     Reason  for your hospital stay    You were hospitalized after a fall.     Follow-up and recommended labs and tests     Follow up with primary care provider, Sameer Brand, within 7 days for hospital follow- up.  No follow up labs or test are needed.     Activity    Your activity upon discharge: activity as tolerated     When to contact your care team    Call your primary doctor if you have any of the following: Worsening pain, fever, shortness of breath, confusion, falls     MD face to face encounter    Documentation of Face to Face and Certification for Home Health Services    I certify that patient: Praveen Velasquez is under my care and that I, or a nurse practitioner or physician's assistant working with me, had a face-to-face encounter that meets the physician face-to-face encounter requirements with this patient on: 5/4/2021.    This encounter with the patient was in whole, or in part, for the following medical condition, which is the primary reason for home health care: Frequent falls, physical deconditioning, mild cognitive impairment    I certify that, based on my findings, the following services are medically necessary home health services: Nursing, Occupational Therapy and Physical Therapy.    My clinical findings support the need for the above services because: Nurse is needed: Wound cares, medication management, assessing vitals., Occupational Therapy Services are needed to assess and treat cognitive ability and address ADL safety due to impairment in activities of daily living, cognitive impairment, physical deconditioning. and Physical Therapy Services are needed to assess and treat the following functional impairments: Weakness, frequent falls, physical deconditioning.    Further, I certify that my clinical findings support that this patient is homebound (i.e. absences from home require considerable and taxing effort and are for medical reasons or Hindu services or infrequently or of short duration when for  other reasons) because: Requires assistance of another person or specialized equipment to access medical services because patient: Requires supervision of another for safe transfer...    Based on the above findings. I certify that this patient is confined to the home and needs intermittent skilled nursing care, physical therapy and/or speech therapy.  The patient is under my care, and I have initiated the establishment of the plan of care.  This patient will be followed by a physician who will periodically review the plan of care.  Physician/Provider to provide follow up care: Sameer Brand    Attending hospital physician (the Medicare certified [place] provider): Alexa Levy MD  Physician Signature: See electronic signature associated with these discharge orders.  Date: 5/4/2021     Full Code     Diet    Follow this diet upon discharge: Orders Placed This Encounter      Regular Diet Adult     Discharge Medications   Current Discharge Medication List      START taking these medications    Details   acetaminophen (TYLENOL) 325 MG tablet Take 2 tablets (650 mg) by mouth every 4 hours as needed for mild pain  Qty:      Associated Diagnoses: Falls frequently      bacitracin 500 UNIT/GM OINT Apply topically every other day  Qty: 14 g, Refills: 0    Associated Diagnoses: Falls frequently      folic acid (FOLVITE) 1 MG tablet Take 1 tablet (1 mg) by mouth daily  Qty: 30 tablet, Refills: 0    Associated Diagnoses: Falls frequently         CONTINUE these medications which have NOT CHANGED    Details   ipratropium (ATROVENT) 0.03 % nasal spray Spray 1 spray into both nostrils every evening      lisinopril (ZESTRIL) 10 MG tablet Take 10 mg by mouth daily          STOP taking these medications       sulfamethoxazole-trimethoprim (BACTRIM DS) 800-160 MG tablet Comments:   Reason for Stopping:             Allergies   No Known Allergies  Data   Most Recent 3 CBC's:  Recent Labs   Lab Test 05/01/21  0612 04/30/21  1311  19  1149   WBC  --  10.0 6.9   HGB 10.4* 12.1* 12.2*   MCV  --  99 101*   PLT  --  219 239      Most Recent 3 BMP's:  Recent Labs   Lab Test 21  0640 21  0612 21  1311    138 135   POTASSIUM 4.2 4.5 4.5   CHLORIDE 105 108 104   CO2 24 24 20   BUN 24 32* 30   CR 1.23 1.59* 1.68*   ANIONGAP 5 6 11   SUDHEER 8.8 8.6 9.0   * 103* 122*     Most Recent 2 LFT's:  Recent Labs   Lab Test 21  0612   AST 45   ALT 40   ALKPHOS 53   BILITOTAL 1.5*     Most Recent INR's and Anticoagulation Dosing History:  Anticoagulation Dose History     Recent Dosing and Labs Latest Ref Rng & Units 2005    INR 0.86 - 1.14 1.07 1.07        Most Recent 3 Troponin's:  Recent Labs   Lab Test 21  2134   TROPI 0.032     Most Recent Cholesterol Panel:No lab results found.  Most Recent 6 Bacteria Isolates From Any Culture (See EPIC Reports for Culture Details):No lab results found.  Most Recent TSH, T4 and A1c Labs:  Recent Labs   Lab Test 21  1311   TSH 0.94       Results for orders placed or performed during the hospital encounter of 21   Echocardiogram Complete    Narrative    [id number]  [id number]  [id number]  [id number]^JACKSON^SHANTI     [facility]  Echocardiography Laboratory  [address] [place] [address]     Name: MIHAI HAMM  MRN: [id number]  : 12/10/1930  Study Date: 2021 09:01 AM  Age: 89 yrs  Gender: Male  Patient Location: [facility]  Reason For Study: Atrial Fibrillation  Ordering Physician: SHANTI PAZ  Referring Physician: Sameer Brand  Performed By: Kandy Barnett RDCS     BSA: 1.8 m2  Height: 71 in  Weight: 144 lb  HR: 60  BP: 137/55 mmHg  ______________________________________________________________________________  Procedure  Complete Portable Echo Adult.  ______________________________________________________________________________  Interpretation Summary     1. The left ventricle is normal in size. There is normal left  ventricular wall  thickness. Left ventricular systolic function is normal. The visual ejection  fraction is estimated at 60-65%. Grade I or early diastolic dysfunction.  Diastolic Doppler findings (E/E' ratio and/or other parameters) suggest left  ventricular filling pressures are increased. No regional wall motion  abnormalities noted.  2. The right ventricle is normal size. The right ventricular systolic function  is normal.  3. The left atrium is mildly dilated. The right atrium is mildly dilated.  4. Mild mitral stenosis.  5. Mild aortic stenosis.  6. No pericardial effusion.  7. No previous study for comparison.  ______________________________________________________________________________  Left Ventricle  The left ventricle is normal in size. There is normal left ventricular wall  thickness. Left ventricular systolic function is normal. The visual ejection  fraction is estimated at 60-65%. Grade I or early diastolic dysfunction.  Diastolic Doppler findings (E/E' ratio and/or other parameters) suggest left  ventricular filling pressures are increased. No regional wall motion  abnormalities noted.     Right Ventricle  The right ventricle is normal size. The right ventricular systolic function is  normal.     Atria  The left atrium is mildly dilated. The right atrium is mildly dilated. There  is no color Doppler evidence of an atrial shunt.     Mitral Valve  There is mild mitral annular calcification. There is trace mitral  regurgitation. There is mild mitral stenosis. The mean mitral valve gradient  is 5.2 mmHg. The peak mitral valve gradient is 10.1 mmHg. (Recorded at a heart  rate of 66 bpm).     Tricuspid Valve  There is trace tricuspid regurgitation. The right ventricular systolic  pressure is approximated at 21.7 mmHg plus the right atrial pressure.     Aortic Valve  The aortic valve is not well visualized. There is trace to mild aortic  regurgitation. Mild valvular aortic stenosis. The peak AoV pressure  gradient  is 27.2 mmHg. The mean AoV pressure gradient is 12.0 mmHg. The calculated  aortic valve are is 1.7 cm^2.     Pulmonic Valve  There is no pulmonic valvular stenosis.     Vessels  The aortic root is normal size. Dilation of the inferior vena cava is present  with normal respiratory variation in diameter.     Pericardium  There is no pericardial effusion.     Rhythm  Sinus rhythm was noted.  ______________________________________________________________________________  MMode/2D Measurements & Calculations  IVSd: 0.98 cm     LVIDd: 3.5 cm  LVIDs: 2.6 cm  LVPWd: 0.90 cm  FS: 24.5 %  LV mass(C)d: 93.1 grams  LV mass(C)dI: 50.8 grams/m2  Ao root diam: 3.6 cm  LA dimension: 4.5 cm  LA/Ao: 1.3  LVOT diam: 2.2 cm  LVOT area: 3.8 cm2  LA Volume (BP): 60.5 ml  LA Volume Index (BP): 33.1 ml/m2  RWT: 0.52     Doppler Measurements & Calculations  MV E max kuldip: 100.0 cm/sec  MV A max kuldip: 131.9 cm/sec  MV E/A: 0.76  MV max PG: 10.1 mmHg  MV mean P.2 mmHg  MV V2 VTI: 33.5 cm  MVA(VTI): 2.8 cm2  MV dec time: 0.37 sec  Ao V2 max: 260.6 cm/sec  Ao max P.2 mmHg  Ao V2 mean: 158.6 cm/sec  Ao mean P.0 mmHg  Ao V2 VTI: 54.7 cm  SAH(I,D): 1.7 cm2  SHA(V,D): 1.9 cm2  LV V1 max P.2 mmHg  LV V1 max: 134.0 cm/sec  LV V1 VTI: 25.2 cm  SV(LVOT): 95.2 ml  SI(LVOT): 51.9 ml/m2  TR max kuldip: 232.8 cm/sec  TR max P.7 mmHg  AV Kuldip Ratio (DI): 0.51  SHA Index (cm2/m2): 0.95  E/E' av.4  Lateral E/e': 18.8  Medial E/e': 20.0     ______________________________________________________________________________  Report approved by: Braeden Govea 2021 01:16 PM

## 2021-05-05 NOTE — PLAN OF CARE
Occupational Therapy Discharge Summary    Reason for therapy discharge:    Discharged to home with home therapy.    Progress towards therapy goal(s). See goals on Care Plan in [facility] electronic health record for goal details.  Goals not met.  Barriers to achieving goals:   discharge from facility.    Therapy recommendation(s):    Continued therapy is recommended.  Rationale/Recommendations:  Patient demo'ing deconditioning, decreased activity tolerance, impaired balance, and mild cognitive deficits that warrant further skilled therapy. Patient unsafe to return home and requires 24 hr supervision/assistance for fxnl mobility and safe transfers and safe participation with aDLs/IADLs.     **Pt not seen by discharging therapist on this date, note written based on previous treating therapist's notes and recommendations.

## 2021-11-15 NOTE — PROGRESS NOTES
Clinic Care Coordination Contact  [facility]/Voicemail       Clinical Data: Care Coordinator Outreach  Outreach attempted x 1.  Left message on pt's addi Zacarias) voicemail with call back information and requested return call.  Care Coordinator will try to reach patient again in 1-2 business days.

## 2021-11-16 NOTE — PROGRESS NOTES
Clinic Care Coordination Contact    Clinic Care Coordination Contact  OUTREACH    Referral Information:  Referral Source: PCP    Primary Diagnosis: Dual -  MH/CD    Chief Complaint   Patient presents with     Clinic Care Coordination - Initial        Universal Utilization:  Two ED visits within 6 months  Clinic Utilization  Difficulty keeping appointments:: Yes  Compliance Concerns: Yes  No-Show Concerns: No  Utilization    Hospital Admissions  1             ED Visits  2             No Show Count (past year)  0                Current as of: 11/16/2021  4:19 AM              Clinical Concerns:  Current Medical Concerns:  Liver failure,heard of hearing, poor eyesight  Current Behavioral Concerns: alcoholism   Education Provided to patient:    Pain  Pain (GOAL):: No  Health Maintenance Reviewed: Due/Overdue None  Clinical Pathway: None    Medication Management:  Medication review status: Medications reviewed.  Changes noted per patient report.  Did not review as I spoke with his daughter, PCP will review at appt. On December 7th     Functional Status:  Dependent ADLs:: Independent  Dependent IADLs:: Cleaning,Cooking,Laundry,Shopping,Meal Preparation,Medication Management,Money Management,Transportation,Incontinence  Bed or wheelchair confined:: No  Mobility Status: Independent  Fallen 2 or more times in the past year?: No  Any fall with injury in the past year?: No    Living Situation:  Type of residence:: [facility]    Lifestyle & Psychosocial Needs:    Social Determinants of Health     Tobacco Use: Not on file   Alcohol Use: Not on file   Financial Resource Strain: Not on file   Food Insecurity: Not on file   Transportation Needs: Not on file   Physical Activity: Not on file   Stress: Not on file   Social Connections: Not on file   Intimate Partner Violence: Not on file   Depression: Not on file   Housing Stability: Not on file     Diet:: Regular  Inadequate nutrition (GOAL):: No  Tube Feeding:  No  Inadequate activity/exercise (GOAL):: No  Significant changes in sleep pattern (GOAL): No  Transportation means:: Family     Mu-ism or spiritual beliefs that impact treatment:: No  Chemical Dependency Status: Current Concern  Informal Support system:: Family             Resources and Interventions:  Current Resources: None, family only     Community Resources: None  Supplies used at home:: None  Equipment Currently Used at Home: none  Employment Status: retired         Advance Care Plan/Directive  Advanced Care Plans/Directives on file:: No    Referrals Placed: Community Resources,[facility] Resources     Goals: move into assisted living      Patient/Caregiver understanding: Yes           Plan: [facility] spoke with Praveen daughter, Deann. She states that Natan's brother currently lives with him and watches out for him but he will be going back to [facility] [place] soon and there will be no one to care for Praveen. Per Deann, they have filed vulnerable adult reports a couple of times with [facility] and they did not open up a case. Per Deann, Praveen drinks a lot , cannot see, or hear and has balance issues in addition to liver failiure. He refuses in home supports and refuses to go into assisted living. Deann and her siblings are pursuing getting him identified as a vulnerable adult so as to take over guardianship. Pt. Has an appt. On December 13 with pcp and they will discuss this at that time. [facility] discussed legalities of this and that she may want to to consult with an  .Praveen does not have ACP or conservatorship over his funds. Family would like to move him into assisted living to ensure he is safe as his home is not safe ( many stairs and some with no guard rails) and there will be no one to assist him once his brother is gone. [facility] informed Deann that Praveen is out of scope for me to work with but that I would email her resources to support their endeavors. Her email is:  [email or web address]    [facility] emailed:  [email or web address]  Myles Zacarias,    Here are some resources to help guide you as you  try and get your dad taken care of.    [facility] has information on Guardianship. Here s a page for you to look at . They have the registration form for the guardianship clinics on this page.  [email or web address]-[email or web address]/Self-Help-Centers.aspx#[email or web address]    Civil commitment may be an option as well however his doctor would need to agree to this.  https://[place]/omhdd/commitment/ekldg-gcjapwwozu-aknpifali-and-reports.jsp      Again, utilizing [facility] front door may be a good place to get some additional supports.  [email or web address]

## 2021-11-17 NOTE — PROGRESS NOTES
Patient identified for care management outreach, however patient is not on a value based contract so cannot complete outreach. Will escalate to clinic staff if specific needs or resources are indicated.     [facility] had visited with pts family and has given resources on several occasions.

## (undated) DEVICE — SPECIMEN CONTAINER 60MLW/10% FORMALIN 59601

## (undated) DEVICE — PEN MARKING SKIN

## (undated) RX ORDER — LIDOCAINE HYDROCHLORIDE 20 MG/ML
INJECTION, SOLUTION EPIDURAL; INFILTRATION; INTRACAUDAL; PERINEURAL
Status: DISPENSED
Start: 2019-11-18

## (undated) RX ORDER — ONDANSETRON 2 MG/ML
INJECTION INTRAMUSCULAR; INTRAVENOUS
Status: DISPENSED
Start: 2019-11-18

## (undated) RX ORDER — PROPOFOL 10 MG/ML
INJECTION, EMULSION INTRAVENOUS
Status: DISPENSED
Start: 2019-11-18